# Patient Record
Sex: MALE | Race: BLACK OR AFRICAN AMERICAN | Employment: UNEMPLOYED | URBAN - METROPOLITAN AREA
[De-identification: names, ages, dates, MRNs, and addresses within clinical notes are randomized per-mention and may not be internally consistent; named-entity substitution may affect disease eponyms.]

---

## 2024-10-23 ENCOUNTER — HOSPITAL ENCOUNTER (INPATIENT)
Facility: HOSPITAL | Age: 59
LOS: 2 days | Discharge: HOME | DRG: 375 | End: 2024-10-25
Attending: EMERGENCY MEDICINE | Admitting: INTERNAL MEDICINE

## 2024-10-23 ENCOUNTER — APPOINTMENT (OUTPATIENT)
Facility: HOSPITAL | Age: 59
DRG: 375 | End: 2024-10-23

## 2024-10-23 DIAGNOSIS — R10.31 RIGHT LOWER QUADRANT ABDOMINAL PAIN: ICD-10-CM

## 2024-10-23 DIAGNOSIS — D64.9 ANEMIA, UNSPECIFIED TYPE: Primary | ICD-10-CM

## 2024-10-23 DIAGNOSIS — K57.32 DIVERTICULITIS OF COLON: ICD-10-CM

## 2024-10-23 PROBLEM — K57.92 DIVERTICULITIS: Status: ACTIVE | Noted: 2024-10-23

## 2024-10-23 LAB
ALBUMIN SERPL-MCNC: 2.9 G/DL (ref 3.5–5)
ALBUMIN/GLOB SERPL: 0.6 (ref 1.1–2.2)
ALP SERPL-CCNC: 78 U/L (ref 45–117)
ALT SERPL-CCNC: 24 U/L (ref 12–78)
ANION GAP SERPL CALC-SCNC: 2 MMOL/L (ref 2–12)
APPEARANCE UR: CLEAR
AST SERPL-CCNC: 26 U/L (ref 15–37)
BACTERIA URNS QL MICRO: NEGATIVE /HPF
BASOPHILS # BLD: 0 K/UL (ref 0–0.1)
BASOPHILS NFR BLD: 0 % (ref 0–1)
BILIRUB SERPL-MCNC: 0.2 MG/DL (ref 0.2–1)
BILIRUB UR QL: NEGATIVE
BUN SERPL-MCNC: 12 MG/DL (ref 6–20)
BUN/CREAT SERPL: 12 (ref 12–20)
CALCIUM SERPL-MCNC: 8.9 MG/DL (ref 8.5–10.1)
CHLORIDE SERPL-SCNC: 110 MMOL/L (ref 97–108)
CO2 SERPL-SCNC: 27 MMOL/L (ref 21–32)
COLOR UR: ABNORMAL
CREAT SERPL-MCNC: 1.02 MG/DL (ref 0.7–1.3)
DIFFERENTIAL METHOD BLD: ABNORMAL
EOSINOPHIL # BLD: 0.2 K/UL (ref 0–0.4)
EOSINOPHIL NFR BLD: 2 % (ref 0–7)
EPITH CASTS URNS QL MICRO: ABNORMAL /LPF
ERYTHROCYTE [DISTWIDTH] IN BLOOD BY AUTOMATED COUNT: 23.5 % (ref 11.5–14.5)
GLOBULIN SER CALC-MCNC: 5 G/DL (ref 2–4)
GLUCOSE SERPL-MCNC: 118 MG/DL (ref 65–100)
GLUCOSE UR STRIP.AUTO-MCNC: NEGATIVE MG/DL
HCT VFR BLD AUTO: 29 % (ref 36.6–50.3)
HEMOCCULT STL QL: NEGATIVE
HGB BLD-MCNC: 7.4 G/DL (ref 12.1–17)
HGB UR QL STRIP: NEGATIVE
HYALINE CASTS URNS QL MICRO: ABNORMAL /LPF (ref 0–2)
IMM GRANULOCYTES # BLD AUTO: 0 K/UL (ref 0–0.04)
IMM GRANULOCYTES NFR BLD AUTO: 0 % (ref 0–0.5)
KETONES UR QL STRIP.AUTO: NEGATIVE MG/DL
LEUKOCYTE ESTERASE UR QL STRIP.AUTO: ABNORMAL
LIPASE SERPL-CCNC: 27 U/L (ref 13–75)
LYMPHOCYTES # BLD: 2 K/UL (ref 0.8–3.5)
LYMPHOCYTES NFR BLD: 25 % (ref 12–49)
MCH RBC QN AUTO: 15.2 PG (ref 26–34)
MCHC RBC AUTO-ENTMCNC: 25.5 G/DL (ref 30–36.5)
MCV RBC AUTO: 59.4 FL (ref 80–99)
MONOCYTES # BLD: 0.8 K/UL (ref 0–1)
MONOCYTES NFR BLD: 10 % (ref 5–13)
NEUTS SEG # BLD: 5.1 K/UL (ref 1.8–8)
NEUTS SEG NFR BLD: 63 % (ref 32–75)
NITRITE UR QL STRIP.AUTO: NEGATIVE
NRBC # BLD: 0 K/UL (ref 0–0.01)
NRBC BLD-RTO: 0 PER 100 WBC
PH UR STRIP: 6 (ref 5–8)
PLATELET # BLD AUTO: 477 K/UL (ref 150–400)
PLATELET COMMENT: ABNORMAL
PMV BLD AUTO: 9.7 FL (ref 8.9–12.9)
POTASSIUM SERPL-SCNC: 3.9 MMOL/L (ref 3.5–5.1)
PROT SERPL-MCNC: 7.9 G/DL (ref 6.4–8.2)
PROT UR STRIP-MCNC: ABNORMAL MG/DL
RBC # BLD AUTO: 4.88 M/UL (ref 4.1–5.7)
RBC #/AREA URNS HPF: ABNORMAL /HPF (ref 0–5)
RBC MORPH BLD: ABNORMAL
SODIUM SERPL-SCNC: 139 MMOL/L (ref 136–145)
SP GR UR REFRACTOMETRY: 1.02
URINE CULTURE IF INDICATED: ABNORMAL
UROBILINOGEN UR QL STRIP.AUTO: 1 EU/DL (ref 0.2–1)
WBC # BLD AUTO: 8.1 K/UL (ref 4.1–11.1)
WBC URNS QL MICRO: ABNORMAL /HPF (ref 0–4)

## 2024-10-23 PROCEDURE — 85025 COMPLETE CBC W/AUTO DIFF WBC: CPT

## 2024-10-23 PROCEDURE — 81001 URINALYSIS AUTO W/SCOPE: CPT

## 2024-10-23 PROCEDURE — 82272 OCCULT BLD FECES 1-3 TESTS: CPT

## 2024-10-23 PROCEDURE — 6360000004 HC RX CONTRAST MEDICATION: Performed by: EMERGENCY MEDICINE

## 2024-10-23 PROCEDURE — 1100000003 HC PRIVATE W/ TELEMETRY

## 2024-10-23 PROCEDURE — 6360000002 HC RX W HCPCS: Performed by: INTERNAL MEDICINE

## 2024-10-23 PROCEDURE — 96374 THER/PROPH/DIAG INJ IV PUSH: CPT

## 2024-10-23 PROCEDURE — 80053 COMPREHEN METABOLIC PANEL: CPT

## 2024-10-23 PROCEDURE — 83690 ASSAY OF LIPASE: CPT

## 2024-10-23 PROCEDURE — 96372 THER/PROPH/DIAG INJ SC/IM: CPT

## 2024-10-23 PROCEDURE — 99285 EMERGENCY DEPT VISIT HI MDM: CPT

## 2024-10-23 PROCEDURE — 6360000002 HC RX W HCPCS: Performed by: EMERGENCY MEDICINE

## 2024-10-23 PROCEDURE — 74177 CT ABD & PELVIS W/CONTRAST: CPT

## 2024-10-23 PROCEDURE — 36415 COLL VENOUS BLD VENIPUNCTURE: CPT

## 2024-10-23 PROCEDURE — 2580000003 HC RX 258: Performed by: INTERNAL MEDICINE

## 2024-10-23 RX ORDER — BUTALBITAL, ACETAMINOPHEN AND CAFFEINE 50; 325; 40 MG/1; MG/1; MG/1
1 TABLET ORAL EVERY 4 HOURS PRN
Status: DISCONTINUED | OUTPATIENT
Start: 2024-10-23 | End: 2024-10-25 | Stop reason: HOSPADM

## 2024-10-23 RX ORDER — POLYETHYLENE GLYCOL 3350 17 G/17G
17 POWDER, FOR SOLUTION ORAL DAILY PRN
Status: DISCONTINUED | OUTPATIENT
Start: 2024-10-23 | End: 2024-10-25 | Stop reason: HOSPADM

## 2024-10-23 RX ORDER — SODIUM CHLORIDE 0.9 % (FLUSH) 0.9 %
5-40 SYRINGE (ML) INJECTION PRN
Status: DISCONTINUED | OUTPATIENT
Start: 2024-10-23 | End: 2024-10-25 | Stop reason: HOSPADM

## 2024-10-23 RX ORDER — POTASSIUM CHLORIDE 1.5 G/1.58G
40 POWDER, FOR SOLUTION ORAL PRN
Status: DISCONTINUED | OUTPATIENT
Start: 2024-10-23 | End: 2024-10-25 | Stop reason: HOSPADM

## 2024-10-23 RX ORDER — MAGNESIUM SULFATE IN WATER 40 MG/ML
2000 INJECTION, SOLUTION INTRAVENOUS PRN
Status: DISCONTINUED | OUTPATIENT
Start: 2024-10-23 | End: 2024-10-25 | Stop reason: HOSPADM

## 2024-10-23 RX ORDER — BUTALBITAL, ACETAMINOPHEN AND CAFFEINE 300; 40; 50 MG/1; MG/1; MG/1
1 CAPSULE ORAL EVERY 4 HOURS PRN
Status: DISCONTINUED | OUTPATIENT
Start: 2024-10-23 | End: 2024-10-23 | Stop reason: SDUPTHER

## 2024-10-23 RX ORDER — DICYCLOMINE HYDROCHLORIDE 10 MG/ML
20 INJECTION INTRAMUSCULAR ONCE
Status: COMPLETED | OUTPATIENT
Start: 2024-10-23 | End: 2024-10-23

## 2024-10-23 RX ORDER — IOPAMIDOL 755 MG/ML
100 INJECTION, SOLUTION INTRAVASCULAR
Status: COMPLETED | OUTPATIENT
Start: 2024-10-23 | End: 2024-10-23

## 2024-10-23 RX ORDER — HYDRALAZINE HYDROCHLORIDE 20 MG/ML
20 INJECTION INTRAMUSCULAR; INTRAVENOUS ONCE
Status: COMPLETED | OUTPATIENT
Start: 2024-10-23 | End: 2024-10-23

## 2024-10-23 RX ORDER — ONDANSETRON 4 MG/1
4 TABLET, ORALLY DISINTEGRATING ORAL EVERY 8 HOURS PRN
Status: DISCONTINUED | OUTPATIENT
Start: 2024-10-23 | End: 2024-10-25 | Stop reason: HOSPADM

## 2024-10-23 RX ORDER — ACETAMINOPHEN 325 MG/1
650 TABLET ORAL EVERY 6 HOURS PRN
Status: DISCONTINUED | OUTPATIENT
Start: 2024-10-23 | End: 2024-10-25 | Stop reason: HOSPADM

## 2024-10-23 RX ORDER — POTASSIUM CHLORIDE 1500 MG/1
40 TABLET, EXTENDED RELEASE ORAL PRN
Status: DISCONTINUED | OUTPATIENT
Start: 2024-10-23 | End: 2024-10-25 | Stop reason: HOSPADM

## 2024-10-23 RX ORDER — ONDANSETRON 2 MG/ML
4 INJECTION INTRAMUSCULAR; INTRAVENOUS EVERY 6 HOURS PRN
Status: DISCONTINUED | OUTPATIENT
Start: 2024-10-23 | End: 2024-10-25 | Stop reason: HOSPADM

## 2024-10-23 RX ORDER — ENOXAPARIN SODIUM 100 MG/ML
30 INJECTION SUBCUTANEOUS 2 TIMES DAILY
Status: DISCONTINUED | OUTPATIENT
Start: 2024-10-23 | End: 2024-10-25 | Stop reason: HOSPADM

## 2024-10-23 RX ORDER — SODIUM CHLORIDE 0.9 % (FLUSH) 0.9 %
5-40 SYRINGE (ML) INJECTION EVERY 12 HOURS SCHEDULED
Status: DISCONTINUED | OUTPATIENT
Start: 2024-10-23 | End: 2024-10-25 | Stop reason: HOSPADM

## 2024-10-23 RX ORDER — SODIUM CHLORIDE 9 MG/ML
INJECTION, SOLUTION INTRAVENOUS PRN
Status: DISCONTINUED | OUTPATIENT
Start: 2024-10-23 | End: 2024-10-25 | Stop reason: HOSPADM

## 2024-10-23 RX ORDER — ACETAMINOPHEN 650 MG/1
650 SUPPOSITORY RECTAL EVERY 6 HOURS PRN
Status: DISCONTINUED | OUTPATIENT
Start: 2024-10-23 | End: 2024-10-25 | Stop reason: HOSPADM

## 2024-10-23 RX ORDER — MORPHINE SULFATE 4 MG/ML
4 INJECTION, SOLUTION INTRAMUSCULAR; INTRAVENOUS EVERY 4 HOURS PRN
Status: DISCONTINUED | OUTPATIENT
Start: 2024-10-23 | End: 2024-10-25 | Stop reason: HOSPADM

## 2024-10-23 RX ORDER — POTASSIUM CHLORIDE 7.45 MG/ML
10 INJECTION INTRAVENOUS PRN
Status: DISCONTINUED | OUTPATIENT
Start: 2024-10-23 | End: 2024-10-25 | Stop reason: HOSPADM

## 2024-10-23 RX ADMIN — ENOXAPARIN SODIUM 30 MG: 100 INJECTION SUBCUTANEOUS at 22:04

## 2024-10-23 RX ADMIN — AMPICILLIN SODIUM AND SULBACTAM SODIUM 3000 MG: 2; 1 INJECTION, POWDER, FOR SOLUTION INTRAMUSCULAR; INTRAVENOUS at 22:17

## 2024-10-23 RX ADMIN — IOPAMIDOL 100 ML: 755 INJECTION, SOLUTION INTRAVENOUS at 07:36

## 2024-10-23 RX ADMIN — DICYCLOMINE HYDROCHLORIDE 20 MG: 10 INJECTION, SOLUTION INTRAMUSCULAR at 06:31

## 2024-10-23 RX ADMIN — HYDRALAZINE HYDROCHLORIDE 20 MG: 20 INJECTION INTRAMUSCULAR; INTRAVENOUS at 10:18

## 2024-10-23 RX ADMIN — SODIUM CHLORIDE, PRESERVATIVE FREE 10 ML: 5 INJECTION INTRAVENOUS at 22:04

## 2024-10-23 RX ADMIN — AMPICILLIN SODIUM AND SULBACTAM SODIUM 3000 MG: 2; 1 INJECTION, POWDER, FOR SOLUTION INTRAMUSCULAR; INTRAVENOUS at 15:43

## 2024-10-23 ASSESSMENT — PAIN SCALES - GENERAL
PAINLEVEL_OUTOF10: 5
PAINLEVEL_OUTOF10: 5
PAINLEVEL_OUTOF10: 10
PAINLEVEL_OUTOF10: 0
PAINLEVEL_OUTOF10: 5

## 2024-10-23 ASSESSMENT — PAIN DESCRIPTION - ORIENTATION: ORIENTATION: RIGHT;LEFT;LOWER

## 2024-10-23 ASSESSMENT — PAIN DESCRIPTION - LOCATION: LOCATION: ABDOMEN

## 2024-10-23 ASSESSMENT — LIFESTYLE VARIABLES
HOW OFTEN DO YOU HAVE A DRINK CONTAINING ALCOHOL: MONTHLY OR LESS
HOW MANY STANDARD DRINKS CONTAINING ALCOHOL DO YOU HAVE ON A TYPICAL DAY: 1 OR 2

## 2024-10-23 NOTE — PROGRESS NOTES
1900. End of Shift Note    Bedside shift change report given to Isaoe RN (oncoming nurse) by Logan Gates RN (offgoing nurse).  Report included the following information SBAR, Kardex, Intake/Output, MAR, and Recent Results    Shift worked:  9949-3257     Shift summary and any significant changes:     No changes     Concerns for physician to address:       Zone phone for oncoming shift:          Activity:     Number times ambulated in hallways past shift: 0  Number of times OOB to chair past shift: 3    Cardiac:   Cardiac Monitoring:            Access:  Current line(s): PIV     Genitourinary:   Urinary status: voiding    Respiratory:      Chronic home O2 use?: NO  Incentive spirometer at bedside: NO       GI:     Current diet:  ADULT DIET; Clear Liquid  Passing flatus: YES  Tolerating current diet: YES       Pain Management:   Patient states pain is manageable on current regimen: YES    Skin:     Interventions: increase time out of bed    Patient Safety:  Fall Score:    Interventions: gripper socks       Length of Stay:  Expected LOS: 3  Actual LOS: 0      Logan Gates RN

## 2024-10-23 NOTE — ED NOTES
Pt alert, conversational, and in no acute distress. Pt resting in bed, bed wheels locked, bed in lowest position, side rails in upright position x1, pt on the monitor x3, and call bell within reach.

## 2024-10-23 NOTE — CONSULTS
Gastroenterology Attending Physician attestation statement and comments.  This patient was seen and examined by me in a face-to-face visit today. I reviewed the medical record including lab work, imaging and other provider notes. I confirmed the history as described above. I spoke to the patient, reviewed the medical record including lab work, imaging and other provider notes. I discussed this case in detail with STANISLAW Estrella. I formulated an  assessment of this patient and developed a treatment plan. I agree with the above consultation note. I agree with the history, exam and assessment and plan as outlined in the note.  I would like to add the followinyo AAM seen for evaluation of presumed diverticulitis associated with few days of preceding mild RLQ abd pain without F/C/N/V, D/C, hematochezia, melena, BRBPR, WL.  Significant fam hx CRC (S at age 54yo).  Pt has not had prior colonoscopy or CRC screening.  PE with VSS, no focal T on PE, RRR, CTA.  Labs w/ Hgb 7.4 (was 11.9 in ), MCV 58.5, nl BUN:Cr.  CT noted cecal base WT with minimal associated inflammation with pericaval LAD to 14mm,  Suggestion of minimal diverticulitis vs. underlying neoplasm.  Ddx for the noted abnl CT with profond microcytic anemia, and RLQ abd pain with fam hx Crc is more concerning for malignancy such as CRC then diverticulitis.  If treated as diverticulitis, would need to complete 10wk course abx and delay colonoscopy investigation for 2wks to allow for healing. We note that cecal location for diverticulitis is unusual   Plan:  1) Complete 10d abx  2) Will reassess tomorrow and determine if can give expedited time for colonoscopy to investigate noted findings.   Gerard Varner MD    GI CONSULTATION NOTE  Scott Warner PA-C  [phone] NP in-hospital cell phone M-F until 4:30  After 5pm or on weekends, please call  for physician on call    NAME: Felipe Nix : 1965 MRN: [id number]   Attending: Valerie  Date/Time:  -40 MG CAPS per capsule Take 1 capsule by mouth every 4 hours as needed 7/2/21   Automatic Reconciliation, Ar   hydroCHLOROthiazide (MICROZIDE) 12.5 MG capsule Take 2 capsules by mouth daily    Automatic Reconciliation, Ar   lidocaine (LIDODERM) 5 % Apply patch to the affected area for 12 hours a day and remove for 12 hours a day. 7/2/21   Automatic Reconciliation, Ar   mupirocin (BACTROBAN) 2 % cream Apply topically 2 times daily 12/15/19   Automatic Reconciliation, Ar   Selenium Sulfide 2.25 % SHAM Use shampoo in scalp 3 times weekly for 2 weeks. 12/15/19   Automatic Reconciliation, Ar       Patient Active Problem List   Diagnosis    Diverticulitis       REVIEW OF SYSTEMS:    Constitutional: negative fever, negative chills, negative weight loss  Eyes:   negative visual changes  ENT:   negative sore throat, tongue or lip swelling   Respiratory:  negative cough, negative dyspnea  Cards:  negative for chest pain, palpitations, lower extremity edema  GI:   See HPI  :  negative for frequency, dysuria  Integument:  negative for rash and pruritus  Heme:  negative for easy bruising and gum/nose bleeding  Musculoskel: negative for myalgias,  back pain and muscle weakness  Neuro: negative for headaches, dizziness, vertigo  Psych: negative for feelings of anxiety, depression   Objective:   VITALS:    Vitals:    10/23/24 1358   BP: (!) 152/101   Pulse: 80   Resp: 16   Temp: 98.8 °F (37.1 °C)   SpO2: 100%       PHYSICAL EXAM:   General:          Alert, WD, WN, cooperative, no distress, appears stated age.    Head:               Normocephalic, without obvious abnormality, atraumatic.  Eyes:               Conjunctivae clear and pale, anicteric sclerae.  Pupils are equal  Abdomen:        Soft, mild RLQ pain. Not distended.  Bowel sounds normal. No masses  Extremities:     Atraumatic, No cyanosis.  No edema. No clubbing  Skin:                Texture, turgor normal. No rashes/lesions/jaundice  Psych:             Good

## 2024-10-23 NOTE — ED PROVIDER NOTES
Comment ANISOCYTOSIS  3+        RBC Comment HYPOCHROMIA  4+        RBC Comment POLYCHROMASIA  PRESENT       Iron and TIBC   Result Value Ref Range    Iron 15 (L) 35 - 150 ug/dL    TIBC 430 250 - 450 ug/dL    Iron % Saturation 3 (L) 20 - 50 %       RADIOLOGIC STUDIES:   Non x-ray images such as CT, Ultrasound and MRI are read by the radiologist. X-ray images are visualized and preliminarily interpreted by the ED Provider with the findings as listed in the ED Course section below.     Interpretation per the Radiologist is listed below, if available at the time of this note:    CT ABDOMEN PELVIS W IV CONTRAST Additional Contrast? None   Final Result   Wall thickening at the base of the cecum with minimal associated inflammation.   While this may be related to minimal diverticulitis, neoplasm should be   considered. There is an associated 14 mm pericecal lymph node.      Electronically signed by TERA MIRELES                    ED Course and Differential Diagnosis/MDM     6:12 AM EDT DDx, ED Course, and Reassessment:    Vitals: Patient Vitals for the past 24 hrs:   BP Temp Pulse Resp SpO2 Weight   10/23/24 0547 (!) 199/99 98.1 °F (36.7 °C) 71 18 100 % 117 kg (257 lb 15 oz)     ED COURSE/Records Reviewed with summary (prior medical records and Nursing notes)  Nursing Notes and Old Medical Records    ED Course as of 10/24/24 1432   Wed Oct 23, 2024   0644 CBC significant for hemoglobin of 7.4 with a hematocrit of 29.  Prior hemoglobin was 11.9 a year ago.  White count within normal limits..  UA negative for infection or blood. [MS]   0712 CMP unremarkable. [MS]   0833 CT ABDOMEN PELVIS W IV CONTRAST Additional Contrast? None  CT positive for wall thickening at the base of the cecum with minimal inflammation.  Could represent diverticulitis versus neoplasm.  With his hemoglobin 7.4, will obtain a stool sample to test for occult blood and discussed with the hospitalist for admission. [MS]   1014 Stool  review of radiographic studies, re-evaluation of patient's condition and review of old charts    Care discussed with: admitting provider        ED FINAL IMPRESSION     1. Anemia, unspecified type    2. Right lower quadrant abdominal pain    3. Diverticulitis of colon      DISPOSITION/PLAN     Admit Note: Pt is being admitted by Dr. Gibson. The results of their tests and reason(s) for their admission have been discussed with pt and/or available family. They convey agreement and understanding for the need to be admitted and for the admission diagnosis.    (Please note that parts of this dictation were completed with voice recognition software. Quite often unanticipated grammatical, syntax, homophones, and other interpretive errors are inadvertently transcribed by the computer software. Please disregards these errors. Please excuse any errors that have escaped final proofreading.)    I am the Primary Clinician of Record.   Ritesh Alvarez MD  (Electronically signed)           Ritesh Alvarez MD  10/24/24 7240

## 2024-10-23 NOTE — ED NOTES
Bedside and Verbal shift change report given to WAYNE Braden (oncoming nurse) by WAYNE Fulton (offgoing nurse). Report included the following information Nurse Handoff Report, ED Encounter Summary, ED SBAR, and Adult Overview.

## 2024-10-23 NOTE — PROGRESS NOTES
1400: Patient arrived from ED. Vitals taken. Admission assessments done.    1430: STANISLAW Chavez from GI at bedside.    1500: Bedside and Verbal shift change report given to Franco (oncoming nurse) by Caitie (offgoing nurse). Report included the following information Nurse Handoff Report.

## 2024-10-23 NOTE — ED NOTES
Attempted to call report to nurse for Rm 3218, WAYNE Blood Ext. [phone]; advised RN off the unit att. Called Med-Tele Charge WAYNE Whitaker.

## 2024-10-23 NOTE — ED NOTES
TRANSFER - OUT REPORT:    Verbal report given to WAYNE Whitaker on Felipe Nix  being transferred to [facility] for routine progression of patient care       Report consisted of patient's Situation, Background, Assessment and   Recommendations(SBAR).     Information from the following report(s) Nurse Handoff Report, ED Encounter Summary, ED SBAR, and MAR was reviewed with the receiving nurse.    [place] Fall Assessment:    Presents to emergency department  because of falls (Syncope, seizure, or loss of consciousness): No  Age > 70: No  Altered Mental Status, Intoxication with alcohol or substance confusion (Disorientation, impaired judgment, poor safety awaremess, or inability to follow instructions): No  Impaired Mobility: Ambulates or transfers with assistive devices or assistance; Unable to ambulate or transer.: No  Nursing Judgement: No          Lines:   Peripheral IV 10/23/24 Distal;Left;Anterior Cephalic (Active)   Site Assessment Clean, dry & intact 10/23/24 0617   Line Status Blood return noted 10/23/24 0617   Line Care Cap changed;Connections checked and tightened 10/23/24 0617   Phlebitis Assessment No symptoms 10/23/24 0617   Infiltration Assessment 0 10/23/24 0617   Dressing Status New dressing applied;Clean, dry & intact 10/23/24 0617   Dressing Type Transparent 10/23/24 0617   Dressing Intervention New 10/23/24 0617        Opportunity for questions and clarification was provided.      Patient transported with:  Monitor and Tech

## 2024-10-23 NOTE — ED TRIAGE NOTES
Pt arrives w cc of sharp RLQ pain x a few days. Denies N/V/D, last BM yday. Pt hypertensive in triage, reports he ran out of BP meds a few months ago.

## 2024-10-24 LAB
BASOPHILS # BLD: 0.1 K/UL (ref 0–0.1)
BASOPHILS NFR BLD: 1 % (ref 0–1)
DIFFERENTIAL METHOD BLD: ABNORMAL
EOSINOPHIL # BLD: 0.2 K/UL (ref 0–0.4)
EOSINOPHIL NFR BLD: 3 % (ref 0–7)
ERYTHROCYTE [DISTWIDTH] IN BLOOD BY AUTOMATED COUNT: 23.5 % (ref 11.5–14.5)
HCT VFR BLD AUTO: 28.3 % (ref 36.6–50.3)
HGB BLD-MCNC: 7.2 G/DL (ref 12.1–17)
IMM GRANULOCYTES # BLD AUTO: 0 K/UL (ref 0–0.04)
IMM GRANULOCYTES NFR BLD AUTO: 0 % (ref 0–0.5)
IRON SATN MFR SERPL: 3 % (ref 20–50)
IRON SERPL-MCNC: 15 UG/DL (ref 35–150)
LYMPHOCYTES # BLD: 1.5 K/UL (ref 0.8–3.5)
LYMPHOCYTES NFR BLD: 24 % (ref 12–49)
MCH RBC QN AUTO: 15.2 PG (ref 26–34)
MCHC RBC AUTO-ENTMCNC: 25.4 G/DL (ref 30–36.5)
MCV RBC AUTO: 59.7 FL (ref 80–99)
MONOCYTES # BLD: 0.7 K/UL (ref 0–1)
MONOCYTES NFR BLD: 11 % (ref 5–13)
NEUTS SEG # BLD: 3.9 K/UL (ref 1.8–8)
NEUTS SEG NFR BLD: 61 % (ref 32–75)
NRBC # BLD: 0 K/UL (ref 0–0.01)
NRBC BLD-RTO: 0 PER 100 WBC
PLATELET # BLD AUTO: 462 K/UL (ref 150–400)
PLATELET COMMENT: ABNORMAL
PMV BLD AUTO: 9.7 FL (ref 8.9–12.9)
RBC # BLD AUTO: 4.74 M/UL (ref 4.1–5.7)
RBC MORPH BLD: ABNORMAL
TIBC SERPL-MCNC: 430 UG/DL (ref 250–450)
WBC # BLD AUTO: 6.4 K/UL (ref 4.1–11.1)

## 2024-10-24 PROCEDURE — 84466 ASSAY OF TRANSFERRIN: CPT

## 2024-10-24 PROCEDURE — 1100000003 HC PRIVATE W/ TELEMETRY

## 2024-10-24 PROCEDURE — 2580000003 HC RX 258: Performed by: INTERNAL MEDICINE

## 2024-10-24 PROCEDURE — 6360000002 HC RX W HCPCS: Performed by: INTERNAL MEDICINE

## 2024-10-24 PROCEDURE — 83540 ASSAY OF IRON: CPT

## 2024-10-24 PROCEDURE — 6370000000 HC RX 637 (ALT 250 FOR IP)

## 2024-10-24 PROCEDURE — 36415 COLL VENOUS BLD VENIPUNCTURE: CPT

## 2024-10-24 PROCEDURE — 6370000000 HC RX 637 (ALT 250 FOR IP): Performed by: INTERNAL MEDICINE

## 2024-10-24 PROCEDURE — 85025 COMPLETE CBC W/AUTO DIFF WBC: CPT

## 2024-10-24 RX ORDER — HYDROCHLOROTHIAZIDE 25 MG/1
25 TABLET ORAL DAILY
Status: DISCONTINUED | OUTPATIENT
Start: 2024-10-24 | End: 2024-10-25 | Stop reason: HOSPADM

## 2024-10-24 RX ADMIN — SODIUM CHLORIDE, PRESERVATIVE FREE 10 ML: 5 INJECTION INTRAVENOUS at 09:04

## 2024-10-24 RX ADMIN — ENOXAPARIN SODIUM 30 MG: 100 INJECTION SUBCUTANEOUS at 09:03

## 2024-10-24 RX ADMIN — HYDROCHLOROTHIAZIDE 25 MG: 25 TABLET ORAL at 10:20

## 2024-10-24 RX ADMIN — AMPICILLIN SODIUM AND SULBACTAM SODIUM 3000 MG: 2; 1 INJECTION, POWDER, FOR SOLUTION INTRAMUSCULAR; INTRAVENOUS at 16:11

## 2024-10-24 RX ADMIN — AMPICILLIN SODIUM AND SULBACTAM SODIUM 3000 MG: 2; 1 INJECTION, POWDER, FOR SOLUTION INTRAMUSCULAR; INTRAVENOUS at 22:17

## 2024-10-24 RX ADMIN — POLYETHYLENE GLYCOL-3350 AND ELECTROLYTES 4000 ML: 236; 6.74; 5.86; 2.97; 22.74 POWDER, FOR SOLUTION ORAL at 17:37

## 2024-10-24 RX ADMIN — AMPICILLIN SODIUM AND SULBACTAM SODIUM 3000 MG: 2; 1 INJECTION, POWDER, FOR SOLUTION INTRAMUSCULAR; INTRAVENOUS at 04:21

## 2024-10-24 RX ADMIN — SODIUM CHLORIDE, PRESERVATIVE FREE 10 ML: 5 INJECTION INTRAVENOUS at 21:00

## 2024-10-24 RX ADMIN — AMPICILLIN SODIUM AND SULBACTAM SODIUM 3000 MG: 2; 1 INJECTION, POWDER, FOR SOLUTION INTRAMUSCULAR; INTRAVENOUS at 10:21

## 2024-10-24 RX ADMIN — IRON SUCROSE 200 MG: 20 INJECTION, SOLUTION INTRAVENOUS at 10:20

## 2024-10-24 ASSESSMENT — PAIN SCALES - GENERAL: PAINLEVEL_OUTOF10: 0

## 2024-10-24 NOTE — PROGRESS NOTES
GI PROGRESS NOTE  Scott Warner PA-C  [phone] NP in-hospital cell phone M-F until 4:30  After 5pm or on weekends, please call  for physician on call    NAME:Felipe Nix :1965 MRN:[id number]   ATTG: [unfilled]   PCP: No primary care provider on file.  Date/Time:  10/24/2024 1:56 PM     Primary GI: Dr. Varner     Reason for following: Diverticulitis, microcytic anemia    Assessment:   ?Diverticulitis in cecum w/ enlarged pericecal LN  RLQ pain  Microcytic anemia  Fhx CRC in first degree relative  VSS  HgB 7.4 POA, last normal 11.9   MCV 59.4  BUNCr nl  CT AP w/  Wall thickening at the base of the cecum with minimal associated inflammation.  While this may be related to minimal diverticulitis, neoplasm should be considered. There is an associated 14 mm pericecal lymph node.    Scope Hx  None prior  Plan:   Given inability to perform expedited colonscopy out patient, will plan for CLN tomorrow to eval  Diverticulitis normally contraindicates CLN to eval at this time. But certainly needs CLN ASAP given cecal diverticulitis is fairly uncommon. Pt has severe micro anemia, Fhx of CRC in sister dx in her 50s Agree w/ CT read that CRC needs R/o at expedited rate.  Continue w/ abx and CLD for diverticulitis treatment at this time.   NPO after midnight  Rest of care per primary team.  We will continue to follow.     Plan discussed with Dr. Varner  Subjective:   Discussed with RN events overnight. Feeling great today. No RLQ pain anymore.    Review of Systems:  Symptom Y/N Comments  Symptom Y/N Comments   Fever/Chills N   Chest Pain N    Cough N   Headaches N    Sputum N   Joint Pain N    SOB/POPE N   Pruritis/Rash N    Tolerating Diet Y   Other       Could NOT obtain due to:      Objective:   VITALS:   Last 24hrs VS reviewed since prior progress note. Most recent are:  Vitals:    10/24/24 0830   BP: (!) 159/90   Pulse: 71   Resp: 18   Temp: 98.6 °F (37 °C)   SpO2: 100%       Intake/Output Summary (Last

## 2024-10-24 NOTE — PROGRESS NOTES
End of Shift Note    Bedside shift change report given to JEFFERSON Hensley (oncoming nurse) by Norbert Vang RN (offgoing nurse).  Report included the following information SBAR REPORTS LIST: SBAR, Kardex, Intake/Output, MAR, Recent Results, Med Rec Status, and Quality Measures    Shift worked:  NIGHT     Shift summary and any significant changes:    The pt had an uneventful night  Declined ordered lab work of Iron TIBC and  Transferrin this evening. Education on the importance of lab work and results was given to the patient. Pt informed nurse to do the lab work in the morning. NP on call was informed.  Lab work done this morning     Concerns for physician to address:       Zone phone for oncoming shift:          Activity:  Activity: Ambulate  Number times ambulated in hallways past shift: 0  Number of times OOB to chair past shift: 2    Cardiac:   Cardiac Monitoring: YES / NO: Yes        Access:  Current line(s): PIV    Genitourinary:   Urinary status: Urinary status: Patient is voiding without difficulty.    Respiratory:   oxygen delivery: room air  Chronic home O2 use?: YES / NO: No  Incentive spirometer at bedside: YES / NO: No      GI:  Current diet:  DIET: clear  Passing flatus: YES / NO: Yes  Tolerating current diet: YES / NO: Yes      Pain Management:   Patient states pain is manageable on current regimen: YES / NO: Yes    Skin:    Interventions: JOSE MIGUEL SCALE: 21    Patient Safety:  Fall Score:   Interventions: Fall Interventions Provided: Implemented/recommended use of non-skid footwear, Implemented/recommended use of fall risk identification flag to all team members, Implemented/recommended assistive devices and encouraged their use, Implemented/recommended resources for alarm system (personal alarm, bed alarm, call bell, etc.) , and Implemented/recommended environmental changes (remove hazards, lower bed, improve lighting, etc.)      Length of Stay:  Expected LOS: 3  Actual LOS: 1      Norbert Vang

## 2024-10-24 NOTE — PROGRESS NOTES
Hospitalist Progress Note    NAME:   Felipe Nix   : 1965   MRN: [id number]     Date/Time: 10/24/2024 2:33 PM  Patient PCP: No primary care provider on file.    Estimated discharge date: 10/26  Barriers: GI clearance, colonoscopy, improvement of anemia      Assessment / Plan:    Acute diverticulitis  -CT is abnormal concerning for inflammation in the cecum and concerning for neoplasm  -He also does have iron deficiency anemia which is new which is concerning for malignancy  -Will continue Unasyn.  GI planning on colonoscopy in a.m. tomorrow.  Hold Lovenox for DVT prophylaxis.  Keep n.p.o. from midnight.  -Appreciate recommendations from GI    Acute iron deficiency anemia due to blood loss  -Iron studies reveal evidence of iron deficiency anemia.  Will start Venofer.  -For colonoscopy in a.m. tomorrow.  Hemoglobin is 7.2.  Check CBC in AM.  Transfuse to keep hemoglobin more than 7    Hypertension  -Continue PTA hydrochlorothiazide            Medical Decision Making:   I personally reviewed labs: CBC, BMP  I personally reviewed imaging: CT abdomen showing cecal inflammation  I personally reviewed EKG: Telemetry monitoring  Toxic drug monitoring: Monitor for allergic reactions while on IV Venofer  Discussed case with: Pharmacy regarding medications        Code Status: Full code  DVT Prophylaxis: SCDs  GI Prophylaxis:    Subjective:     Chief Complaint / Reason for Physician Visit  Reports feeling better.  Does not report any abdominal pain, nausea vomiting, diarrhea or constipation  Overnight events noted      Objective:     VITALS:   Last 24hrs VS reviewed since prior progress note. Most recent are:  Patient Vitals for the past 24 hrs:   BP Temp Temp src Pulse Resp SpO2   10/24/24 0830 (!) 159/90 98.6 °F (37 °C) Oral 71 18 100 %   10/24/24 0306 131/65 98.2 °F (36.8 °C) Oral 60 17 96 %   10/23/24 2010 (!) 142/80 98.3 °F (36.8 °C) Oral 87 16 99 %   10/23/24 1710 136/81 98.4 °F (36.9 °C) -- 84 -- 100 %

## 2024-10-24 NOTE — H&P
Hospitalist Admission Note    NAME:   Felipe Nix   : 1965   MRN: [id number]     Date/Time: 10/23/2024 8:52 PM    Patient PCP: No primary care provider on file.    ______________________________________________________________________  Given the patient's current clinical presentation, I have a high level of concern for decompensation if discharged from the emergency department.  Complex decision making was performed, which includes reviewing the patient's available past medical records, laboratory results, and x-ray films.       My assessment of this patient's clinical condition and my plan of care is as follows.    Assessment / Plan:  Right lower quadrant abdominal pain  -CT of abdomen showed possible cecal lesion possible mild diverticulitis versus others  -The symptoms are not typical for diverticulitis, patient has no rebound tenderness the location is not a common location for diverticulitis, lab workup does not match with diverticulitis as well as CT findings not typical for diverticulitis moreover patient has severe anemia most likely iron deficiency anemia (hypochromic microcytic with high RDW) and I am under the impression the  malignancy is on the top of the list of differential diagnosis  - Patient empirically started on Unasyn  - Clear liquid  - Consult with GI to see if the patient is a candidate for colonoscopy patient has not had a colonoscopy   he never had a colonoscopy done he never had colonoscopy done    Severe anemia hypochromic microcytic  Hemoglobin on 2022 was 11.9 and current hemoglobin is 7.4  Most likely iron deficiency anemia with GI service, proceed with iron workup patient might benefit from IV iron transfusion      Medical Decision Making:   I personally reviewed labs:  I personally reviewed imaging:  I personally reviewed EKG:  Toxic drug monitoring:   Discussed case with: ED provider. After discussion I am in agreement that acuity of patient's medical condition  node. Electronically signed by TEAR MORRISSEY     _______________________________________________________________________    TOTAL TIME:  76 Minutes    Critical Care Provided     Minutes non procedure based    Signed: Shahab Padilla MD    Procedures: see electronic medical records for all procedures/Xrays and details which were not copied into this note but were reviewed prior to creation of Plan.

## 2024-10-25 ENCOUNTER — ANESTHESIA EVENT (OUTPATIENT)
Facility: HOSPITAL | Age: 59
End: 2024-10-25

## 2024-10-25 ENCOUNTER — ANESTHESIA (OUTPATIENT)
Facility: HOSPITAL | Age: 59
End: 2024-10-25

## 2024-10-25 VITALS
OXYGEN SATURATION: 100 % | RESPIRATION RATE: 18 BRPM | HEART RATE: 73 BPM | BODY MASS INDEX: 34.94 KG/M2 | DIASTOLIC BLOOD PRESSURE: 82 MMHG | HEIGHT: 72 IN | TEMPERATURE: 98.4 F | WEIGHT: 257.94 LBS | SYSTOLIC BLOOD PRESSURE: 137 MMHG

## 2024-10-25 LAB
ANION GAP SERPL CALC-SCNC: 4 MMOL/L (ref 2–12)
BASOPHILS # BLD: 0 K/UL (ref 0–0.1)
BASOPHILS NFR BLD: 0 % (ref 0–1)
BUN SERPL-MCNC: 9 MG/DL (ref 6–20)
BUN/CREAT SERPL: 9 (ref 12–20)
CALCIUM SERPL-MCNC: 8.9 MG/DL (ref 8.5–10.1)
CHLORIDE SERPL-SCNC: 104 MMOL/L (ref 97–108)
CO2 SERPL-SCNC: 29 MMOL/L (ref 21–32)
CREAT SERPL-MCNC: 0.95 MG/DL (ref 0.7–1.3)
DIFFERENTIAL METHOD BLD: ABNORMAL
EOSINOPHIL # BLD: 0.1 K/UL (ref 0–0.4)
EOSINOPHIL NFR BLD: 2 % (ref 0–7)
ERYTHROCYTE [DISTWIDTH] IN BLOOD BY AUTOMATED COUNT: 23.6 % (ref 11.5–14.5)
GLUCOSE SERPL-MCNC: 77 MG/DL (ref 65–100)
HCT VFR BLD AUTO: 28.2 % (ref 36.6–50.3)
HGB BLD-MCNC: 7.3 G/DL (ref 12.1–17)
IMM GRANULOCYTES # BLD AUTO: 0 K/UL (ref 0–0.04)
IMM GRANULOCYTES NFR BLD AUTO: 0 % (ref 0–0.5)
LYMPHOCYTES # BLD: 1.4 K/UL (ref 0.8–3.5)
LYMPHOCYTES NFR BLD: 19 % (ref 12–49)
MCH RBC QN AUTO: 15.5 PG (ref 26–34)
MCHC RBC AUTO-ENTMCNC: 25.9 G/DL (ref 30–36.5)
MCV RBC AUTO: 60 FL (ref 80–99)
MONOCYTES # BLD: 0.6 K/UL (ref 0–1)
MONOCYTES NFR BLD: 8 % (ref 5–13)
NEUTS SEG # BLD: 5.2 K/UL (ref 1.8–8)
NEUTS SEG NFR BLD: 71 % (ref 32–75)
NRBC # BLD: 0 K/UL (ref 0–0.01)
NRBC BLD-RTO: 0 PER 100 WBC
PLATELET # BLD AUTO: 480 K/UL (ref 150–400)
PLATELET COMMENT: ABNORMAL
POTASSIUM SERPL-SCNC: 3.4 MMOL/L (ref 3.5–5.1)
RBC # BLD AUTO: 4.7 M/UL (ref 4.1–5.7)
RBC MORPH BLD: ABNORMAL
SODIUM SERPL-SCNC: 137 MMOL/L (ref 136–145)
WBC # BLD AUTO: 7.3 K/UL (ref 4.1–11.1)

## 2024-10-25 PROCEDURE — 85025 COMPLETE CBC W/AUTO DIFF WBC: CPT

## 2024-10-25 PROCEDURE — 88305 TISSUE EXAM BY PATHOLOGIST: CPT

## 2024-10-25 PROCEDURE — 7100000011 HC PHASE II RECOVERY - ADDTL 15 MIN: Performed by: INTERNAL MEDICINE

## 2024-10-25 PROCEDURE — 6370000000 HC RX 637 (ALT 250 FOR IP): Performed by: INTERNAL MEDICINE

## 2024-10-25 PROCEDURE — 6360000002 HC RX W HCPCS: Performed by: INTERNAL MEDICINE

## 2024-10-25 PROCEDURE — 2500000003 HC RX 250 WO HCPCS: Performed by: NURSE ANESTHETIST, CERTIFIED REGISTERED

## 2024-10-25 PROCEDURE — 3700000001 HC ADD 15 MINUTES (ANESTHESIA): Performed by: INTERNAL MEDICINE

## 2024-10-25 PROCEDURE — 88360 TUMOR IMMUNOHISTOCHEM/MANUAL: CPT

## 2024-10-25 PROCEDURE — 7100000010 HC PHASE II RECOVERY - FIRST 15 MIN: Performed by: INTERNAL MEDICINE

## 2024-10-25 PROCEDURE — 0DBH8ZX EXCISION OF CECUM, VIA NATURAL OR ARTIFICIAL OPENING ENDOSCOPIC, DIAGNOSTIC: ICD-10-PCS | Performed by: INTERNAL MEDICINE

## 2024-10-25 PROCEDURE — 3600007512: Performed by: INTERNAL MEDICINE

## 2024-10-25 PROCEDURE — 3600007502: Performed by: INTERNAL MEDICINE

## 2024-10-25 PROCEDURE — 6360000002 HC RX W HCPCS: Performed by: NURSE ANESTHETIST, CERTIFIED REGISTERED

## 2024-10-25 PROCEDURE — 2709999900 HC NON-CHARGEABLE SUPPLY: Performed by: INTERNAL MEDICINE

## 2024-10-25 PROCEDURE — 80048 BASIC METABOLIC PNL TOTAL CA: CPT

## 2024-10-25 PROCEDURE — 2580000003 HC RX 258: Performed by: INTERNAL MEDICINE

## 2024-10-25 PROCEDURE — 36415 COLL VENOUS BLD VENIPUNCTURE: CPT

## 2024-10-25 PROCEDURE — 3700000000 HC ANESTHESIA ATTENDED CARE: Performed by: INTERNAL MEDICINE

## 2024-10-25 PROCEDURE — 3E00XMZ INTRODUCTION OF PIGMENT INTO SKIN AND MUCOUS MEMBRANES, EXTERNAL APPROACH: ICD-10-PCS | Performed by: INTERNAL MEDICINE

## 2024-10-25 RX ORDER — L.ACID,PARA/B.BIFIDUM/S.THERM 8B CELL
1 CAPSULE ORAL DAILY
Qty: 10 CAPSULE | Refills: 0 | Status: SHIPPED | OUTPATIENT
Start: 2024-10-25

## 2024-10-25 RX ORDER — FERROUS SULFATE 325(65) MG
325 TABLET ORAL
Qty: 90 TABLET | Refills: 1 | Status: SHIPPED | OUTPATIENT
Start: 2024-10-25

## 2024-10-25 RX ORDER — POTASSIUM CHLORIDE 750 MG/1
40 TABLET, EXTENDED RELEASE ORAL ONCE
Status: COMPLETED | OUTPATIENT
Start: 2024-10-25 | End: 2024-10-25

## 2024-10-25 RX ADMIN — POTASSIUM CHLORIDE 40 MEQ: 750 TABLET, EXTENDED RELEASE ORAL at 11:28

## 2024-10-25 RX ADMIN — HYDROCHLOROTHIAZIDE 25 MG: 25 TABLET ORAL at 08:48

## 2024-10-25 RX ADMIN — PROPOFOL 50 MG: 10 INJECTION, EMULSION INTRAVENOUS at 10:15

## 2024-10-25 RX ADMIN — LIDOCAINE HYDROCHLORIDE 100 MG: 20 INJECTION, SOLUTION EPIDURAL; INFILTRATION; INTRACAUDAL; PERINEURAL at 10:04

## 2024-10-25 RX ADMIN — PROPOFOL 50 MG: 10 INJECTION, EMULSION INTRAVENOUS at 10:12

## 2024-10-25 RX ADMIN — PROPOFOL 50 MG: 10 INJECTION, EMULSION INTRAVENOUS at 10:09

## 2024-10-25 RX ADMIN — IRON SUCROSE 200 MG: 20 INJECTION, SOLUTION INTRAVENOUS at 11:28

## 2024-10-25 RX ADMIN — PROPOFOL 50 MG: 10 INJECTION, EMULSION INTRAVENOUS at 10:04

## 2024-10-25 RX ADMIN — PROPOFOL 50 MG: 10 INJECTION, EMULSION INTRAVENOUS at 10:17

## 2024-10-25 RX ADMIN — AMPICILLIN SODIUM AND SULBACTAM SODIUM 3000 MG: 2; 1 INJECTION, POWDER, FOR SOLUTION INTRAMUSCULAR; INTRAVENOUS at 11:29

## 2024-10-25 RX ADMIN — PROPOFOL 50 MG: 10 INJECTION, EMULSION INTRAVENOUS at 10:19

## 2024-10-25 RX ADMIN — PROPOFOL 50 MG: 10 INJECTION, EMULSION INTRAVENOUS at 10:05

## 2024-10-25 RX ADMIN — AMPICILLIN SODIUM AND SULBACTAM SODIUM 3000 MG: 2; 1 INJECTION, POWDER, FOR SOLUTION INTRAMUSCULAR; INTRAVENOUS at 04:05

## 2024-10-25 RX ADMIN — SODIUM CHLORIDE, PRESERVATIVE FREE 10 ML: 5 INJECTION INTRAVENOUS at 08:48

## 2024-10-25 ASSESSMENT — PAIN SCALES - GENERAL: PAINLEVEL_OUTOF10: 0

## 2024-10-25 ASSESSMENT — PAIN - FUNCTIONAL ASSESSMENT: PAIN_FUNCTIONAL_ASSESSMENT: 0-10

## 2024-10-25 NOTE — PROGRESS NOTES
Discharge order received and patient was informed. Discharge education provided to patient  at the bedside. Opportunity for questions offered and  clarification provided as required. Patient verbalized understanding of discharge instructions. Peripheral IV access removed and belongings returned to patient at discharge. Copy of discharge paperwork provided to patient for personal reference. Patient was stable during shift and at discharge. Patient was safely discharged from the unit.

## 2024-10-25 NOTE — CARE COORDINATION
Chart reviewed and discharge order seen.     CM returned to unit. Nurse stated that the patient left the floor. He left when he discovered he was discharged and did not wait for dc paperwork.    CM unable to see patient, prior to leaving.    Kiara Plascencia, WAYNE  Care Manager

## 2024-10-25 NOTE — CONSULTS
Oral Q4H PRN         Review of Systems:  Constitutional No fevers, chills, night sweats, excessive fatigue or weight loss.   Allergic/Immunologic No recent allergic reactions   Eyes No significant visual difficulties. No diplopia.   ENMT No problems with hearing, no sore throat, no sinus drainage.   Endocrine No hot flashes or night sweats. No cold intolerance, polyuria, or polydipsia   Hematologic/Lymphatic No easy bruising or bleeding.  The patient denies any tender or palpable lymph nodes   Breasts No abnormal masses of breast, nipple discharge or pain.   Respiratory No dyspnea on exertion, orthopnea, chest pain, cough or hemoptysis.   Cardiovascular No anginal chest pain, irregular heart beat, tachycardia, palpitations or orthopnea.   Gastrointestinal No nausea, vomiting, diarrhea, constipation, cramping, dysphagia, reflux, heartburn, GI bleeding, or early satiety.  No change in bowel habits.+stomach pain   Genitourinary (M) No hematuria, dysuria, increased frequency, urgency, hesitancy or incontinence.   Musculoskeletal No joint pain, swelling or redness. No decreased range of motion.   Integumentary No chronic rashes, inflammation, ulcerations, pruritus, petechiae, purpura, ecchymoses, or skin changes.   Neurologic No headache, blurred vision, and no areas of focal weakness or numbness. Normal gait. No sensory problems.   Psychiatric No insomnia, depression, ramon or mood swings.  No psychotropic drugs.       Physical Exam:  Constitutional Alert, cooperative, oriented. Mood and affect appropriate. Appears close to chronological age. Well nourished. Well developed.   Head Normocephalic; no scars   Eyes Conjunctivae and sclerae are clear and without icterus. Pupils are reactive and equal.   ENMT Sinuses are nontender.  No oral exudates, ulcers, masses, thrush or mucositis. Oropharynx clear.  Tongue normal.   Neck Supple without masses or thyromegaly. No jugular venous distension.   Hematologic/Lymphatic No  petechiae or purpura.  No tender or palpable lymph nodes in the cervical, supraclavicular, axillary or inguinal area.   Respiratory Lungs are clear to auscultation without rhonchi or wheezing.   Cardiovascular Regular rate and rhythm of heart without murmurs, gallops or rubs.   Chest / Line Site Chest is symmetric with no chest wall deformities.   Abdomen Non-tender, non-distended, no masses, ascites or hepatosplenomegaly. Good bowel sounds. No guarding or rebound tenderness. No pulsatile masses.   Musculoskeletal No tenderness or swelling, normal range of motion without obvious weakness.   Extremities No visible deformities, no cyanosis, clubbing or edema.    Skin No rashes, scars, or lesions suggestive of malignancy. No petechiae, purpura, or ecchymoses. No excoriations.    Neurologic No sensory or motor deficits, normal cerebellar function, normal gait, cranial nerves intact.   Psychiatric Alert and oriented times three. Coherent speech. Verbalizes understanding of our discussions today.

## 2024-10-25 NOTE — H&P
See prior Consultation Note. The patient was reexamined. No interval change in the last 48hrs. High suspicion for CRC with cecal involvement as opposed to diverticulitis based on presentation.. Proceed with procedure(s) with deep sedation or conscious sedation as clinically indicated.

## 2024-10-25 NOTE — OP NOTE
NAME:  Felipe Nix   :   1965   MRN:   [id number]     Date/Time:  10/25/2024 10:32 AM    Colonoscopy Operative Report    Procedure Type:   Colonoscopy with biopsy, Pari Ink Tattooing     Indications:    Abnl CT with cecal mass, fam hx CRC  Pre-operative Diagnosis: see indication above  Post-operative Diagnosis:  See findings below  :  Gerard Varner MD  Referring Provider: -MD Camilo-No primary care provider on file.    Exam:  Airway: clear, no airway problems anticipated  Heart: RRR, without gallops or rubs  Lungs: clear bilaterally without wheezes, crackles, or rhonchi  Abdomen: soft, nontender, nondistended, bowel sounds present  Mental Status: awake, alert and oriented to person, place and time    Sedation:  MAC anesthesia Propofol 350mg IV  Procedure Details:  After informed consent was obtained with all risks and benefits of procedure explained and preoperative exam completed, the patient was taken to the endoscopy suite and placed in the left lateral decubitus position.  Upon sequential sedation as per above, a digital rectal exam was performed demonstrating internal hemorrhoids.  The Olympus videocolonoscope  was inserted in the rectum and carefully advanced to the cecum based on position although mass obliterated cecal base and appendiceal orifice.   The quality of preparation was adequate.  The colonoscope was slowly withdrawn with careful evaluation between folds. Retroflexion in the rectum was completed demonstrating internal hemorrhoids.     Findings:     -Large malignant appearing cecal mass, obliterating cecal base and appendiceal orifice, extending to ileocecal valve.  Mass is biopsied and 3 separate 1cc aliquots of Pari Ink are injected submucosally immediately distal to the mass.    -Scant sigmoid diverticulosis  -Small grade 1 internal hemorrhoids    Specimen Removed:  #1 cecal mass  Complications: None.   EBL:  None.    Impression:    -Large malignant appearing cecal mass,

## 2024-10-25 NOTE — DISCHARGE SUMMARY
PTA hydrochlorothiazide    Patient seen and examined today, vital signs are stable, lab work is at baseline and is being released in much improved condition for outpatient follow-up.  He will follow-up with Dr. Jarvis from oncology for discussion of: Biopsy results due to concerns for malignancy    Pending results: Colonic biopsy results are pending at this time and these will be followed by Dr. Jarvis and she will communicate with the patient.  I personally spoke to Dr. Jarvis from oncology about pending colon biopsy results.            Discharge Exam:  Patient seen and examined by me on discharge day.  Pertinent Findings:  Patient Vitals for the past 24 hrs:   BP Temp Temp src Pulse Resp SpO2   10/25/24 1059 137/82 98.4 °F (36.9 °C) Oral 73 18 100 %   10/25/24 1043 (!) 150/69 -- -- 72 12 100 %   10/25/24 1040 (!) 135/59 -- -- 76 18 100 %   10/25/24 1037 132/61 -- -- 77 12 99 %   10/25/24 1034 (!) 124/44 97.8 °F (36.6 °C) -- 77 16 98 %   10/25/24 1023 137/74 98.5 °F (36.9 °C) -- 88 14 100 %   10/25/24 0944 (!) 174/92 98.1 °F (36.7 °C) Temporal 76 13 98 %   10/25/24 0846 (!) 147/79 99 °F (37.2 °C) Oral 78 18 100 %   10/25/24 0301 139/67 97.8 °F (36.6 °C) Oral 75 18 95 %   10/24/24 2342 -- -- -- -- -- 100 %   10/24/24 1944 (!) 144/80 97.7 °F (36.5 °C) Oral 75 15 100 %       Gen:    Not in distress  Chest: Clear lungs  CVS:   Regular rhythm.  No edema  Abd:  Soft, not distended, not tender  Neuro: awake, moving all exts    Discharge/Recent Laboratory Results:  Recent Labs     10/25/24  0554      K 3.4*      CO2 29   BUN 9   CREATININE 0.95   GLUCOSE 77   CALCIUM 8.9     Recent Labs     10/25/24  0554   HGB 7.3*   HCT 28.2*   WBC 7.3   *       Discharge Medications:     Medication List        START taking these medications      amoxicillin-clavulanate 875-125 MG per tablet  Commonly known as: AUGMENTIN  Take 1 tablet by mouth 2 times daily for 10 days     ferrous sulfate 325 (65 Fe) MG tablet  Commonly known  prescriptions, and preparing report for sign off to her PCP) :  35 minutes

## 2024-10-25 NOTE — PROGRESS NOTES
GI Procedure Summary-    Impression:    -Large malignant appearing cecal mass, obliterating cecal base and appendiceal orifice, extending to ileocecal valve.  Mass is biopsied and 3 separate 1cc aliquots of Pari Ink are injected submucosally immediately distal to the mass.    -Scant sigmoid diverticulosis  -Small grade 1 internal hemorrhoids    Recommendations: -  -Await pathology.,   -Check CEA   -Resume Regular diet.   -He will need outpatient follow-up with Oncologist after pathology results available (will not be back until Monday afternoon or Tuesday Morning)    -Complete antibiotic course as 10d total antibiotics  -Will contact patient with result at cell [phone] or his wife, Shari at [phone]  -Resume normal medication(s).    -You will receive a letter about the biopsy results in about 10 days.  You may be asked to call your doctor's office for the results.       Will sign off as does not need to remain as IP    Gerard Varner MD

## 2024-10-25 NOTE — CARE COORDINATION
CM attempted to meet with patient. Out of room for procedure. Will attempt to see later.    CM will follow up.    Kiara Plascencia, RN  Care Manager  [phone]

## 2024-10-25 NOTE — PERIOP NOTE
Patient arrived to endoscopy unit with chart, tele box, and cell phone.    Scope pre cleaned by Doroteo PINZON    Cell phone returned to patient    TRANSFER - OUT REPORT:    Verbal report given to Chris BLACK on Felipe Nix  being transferred to [facility] for routine progression of patient care       Report consisted of patient's Situation, Background, Assessment and   Recommendations(SBAR).     Information from the following report(s) Nurse Handoff Report was reviewed with the receiving nurse.           Lines:   Peripheral IV 10/23/24 Distal;Left;Anterior Cephalic (Active)   Site Assessment Clean, dry & intact 10/25/24 0846   Line Status Flushed;Normal saline locked;Capped 10/25/24 0846   Line Care Connections checked and tightened 10/25/24 0846   Phlebitis Assessment No symptoms 10/25/24 0846   Infiltration Assessment 0 10/25/24 0846   Alcohol Cap Used Yes 10/25/24 0846   Dressing Status Clean, dry & intact 10/25/24 0846   Dressing Type Transparent 10/25/24 0846   Dressing Intervention New 10/23/24 0617        Opportunity for questions and clarification was provided.      Patient transported with:  Tele box, cell phone, patient chart

## 2024-10-25 NOTE — PROGRESS NOTES
End of Shift Note    Bedside shift change report given to Chris/WAYNE (oncoming nurse) by Madie Veliz RN (offgoing nurse).  Report included the following information SBAR REPORTS LIST: SBAR, Kardex, Intake/Output, MAR, Recent Results, and Cardiac Rhythm NSR    Shift worked:  1630-4116     Shift summary and any significant changes:     Pt.had uneventful night, AxO4, on room air,denies any pain and not in distress.NPO post midnight.For colonoscopy today.     Concerns for physician to address:       Zone phone for oncoming shift:          Activity:  Activity: Out of bed  Number times ambulated in hallways past shift: 0  Number of times OOB to chair past shift: 2    Cardiac:   Cardiac Monitoring: YES / NO: Yes        Access:  Current line(s): IV ACCESS: - PIV    Genitourinary:   Urinary status: Urinary status: Patient is voiding without difficulty.    Respiratory:   oxygen delivery: room air  Chronic home O2 use?: YES / NO: No  Incentive spirometer at bedside: YES / NO: No      GI:  Current diet:  DIET: clear  Passing flatus: YES / NO: Yes  Tolerating current diet: YES / NO: Yes      Pain Management:   Patient states pain is manageable on current regimen: YES / NO: Yes    Skin:    Interventions: JOSE MIGUEL SCALE: 22    Patient Safety:  Fall Score: FALL RISK ASSESSMENT: Not at risk for falls.  Interventions: Fall Interventions Provided: Implemented/recommended use of non-skid footwear, Implemented/recommended environmental changes (remove hazards, lower bed, improve lighting, etc.), and Implemented/recommended increased supervision/assistance      Length of Stay:  Expected LOS: 3  Actual LOS: 2      Madie Veliz RN                             Private Auto Walk in

## 2024-10-25 NOTE — DISCHARGE INSTRUCTIONS
Patient Discharge Instructions    Felipe Nix / [id number] : 1965    Admitted 10/23/2024 Discharged: 10/25/2024         DISCHARGE DIAGNOSIS:   Acute diverticulitis  Acute iron deficiency anemia due to blood loss  Hypertension      Take Home Medications     {Medication reconciliation information is now added to the patient's AVS automatically when it is printed.  There is no need to use this SmartLink in discharge instructions.  Highlight this text and delete it to clear this message}      General drug facts     If you have a very bad allergy, wear an allergy ID at all times.   It is important that you take the medication exactly as they are prescribed.   Keep your medication in the bottles provided by the pharmacist.  Keep a list of all your drugs (prescription, natural products, vitamins, OTC) with you. Give this list to your doctor.  Do not take other medications without consulting your doctor.    Do not share your drugs with others and do not take anyone else's drugs.   Keep all drugs out of the reach of children and pets.    Most drugs may be thrown away in household trash after mixing with coffee grounds or cailin litter and sealing in a plastic bag.    Keep a list Call your doctor for help with any side effects. If in the [place] you may also call the FDA at 3-108-FDA-1291    Talk with the doctor before starting any new drug, including OTC, natural products, or vitamins.        What to do at Home    1. Recommended diet: regular     2. Recommended activity: activity as tolerated    3. If you experience any of the following symptoms then please call your primary care physician or return to the emergency room if you cannot get hold of your doctor:    4. Wound Care: none     5. Lab work: cbc and bmp in 1 week     6.Bring these papers with you to your follow up appointments. The papers will help your doctors be sure to continue the care plan from the hospital.      If you have questions regarding the

## 2024-10-25 NOTE — CARE COORDINATION
CM made attempt to contact pt to complete CM initial assessment  was unsuccessful. Phone was kept ringing. As per chart pt is listed as self pay and not connected with PCP. Floor Cm will follow up.    [place] RODRIGUEZW    Ext [phone]

## 2024-10-25 NOTE — PROGRESS NOTES
Endoscopy recovery  Patient returned to baseline, vital signs stable (see vital sign flowsheet). Patient offered liquids and tolerated well. Respiratory status within defined limits. Abdomen soft not tender. Skin with in defined limits.       1047-Pt returned back to room 3219 via transport.

## 2024-10-25 NOTE — ANESTHESIA POSTPROCEDURE EVALUATION
Department of Anesthesiology  Postprocedure Note    Patient: Felipe Nix  MRN: [id number]  YOB: 1965  Date of evaluation: 10/25/2024    Procedure Summary       Date: 10/25/24 Room / Location: [facility] ENDO 01 / [facility] ENDOSCOPY    Anesthesia Start: 1004 Anesthesia Stop: 1023    Procedure: COLONOSCOPY DIAGNOSTIC (Lower GI Region) Diagnosis:       Iron deficiency anemia, unspecified iron deficiency anemia type      (Iron deficiency anemia, unspecified iron deficiency anemia type [D50.9])    Surgeons: Gerard Varner MD Responsible Provider: Noel Dove MD    Anesthesia Type: MAC ASA Status: 2            Anesthesia Type: MAC    Navid Phase I: Navid Score: 10    Navid Phase II: Navid Score: 10    Anesthesia Post Evaluation    Patient location during evaluation: PACU  Patient participation: complete - patient participated  Level of consciousness: sleepy but conscious and responsive to verbal stimuli  Pain score: 0  Airway patency: patent  Nausea & Vomiting: no vomiting and no nausea  Cardiovascular status: blood pressure returned to baseline and hemodynamically stable  Respiratory status: acceptable  Hydration status: stable    No notable events documented.

## 2024-10-25 NOTE — ANESTHESIA PRE PROCEDURE
chloride (KLOR-CON M) extended release tablet 40 mEq  40 mEq Oral PRN Shahab Padilla MD        Or   • potassium chloride (KLOR-CON) 20 MEQ packet 40 mEq  40 mEq Oral PRN Shahab Padilla MD        Or   • potassium chloride 10 mEq/100 mL IVPB (Peripheral Line)  10 mEq IntraVENous PRN Shahab Padilla MD       • magnesium sulfate 2000 mg in 50 mL IVPB premix  2,000 mg IntraVENous PRN Shahab Padilla MD       • [Held by provider] enoxaparin Sodium (LOVENOX) injection 30 mg  30 mg SubCUTAneous BID Shahab Padilla MD   30 mg at 10/24/24 0903   • ondansetron (ZOFRAN-ODT) disintegrating tablet 4 mg  4 mg Oral Q8H PRN Shahab Padilla MD        Or   • ondansetron (ZOFRAN) injection 4 mg  4 mg IntraVENous Q6H PRN Shahab Padilla MD       • polyethylene glycol (GLYCOLAX) packet 17 g  17 g Oral Daily PRN Shahab Padilla MD       • acetaminophen (TYLENOL) tablet 650 mg  650 mg Oral Q6H PRN Shahab Padilla MD        Or   • acetaminophen (TYLENOL) suppository 650 mg  650 mg Rectal Q6H PRN Shahab Padilla MD       • ampicillin-sulbactam (UNASYN) 3,000 mg in sodium chloride 0.9 % 100 mL IVPB (mini-bag)  3,000 mg IntraVENous Q6H Shahab Padilla MD   Stopped at 10/25/24 0435   • butalbital-acetaminophen-caffeine (FIORICET, ESGIC) per tablet 1 tablet  1 tablet Oral Q4H PRN Shahab Padilla MD           Allergies:  No Known Allergies    Problem List:    Patient Active Problem List   Diagnosis Code   • Diverticulitis K57.92       Past Medical History:  History reviewed. No pertinent past medical history.    Past Surgical History:  History reviewed. No pertinent surgical history.    Social History:    Social History     Tobacco Use   • Smoking status: Never   • Smokeless tobacco: Never   Substance Use Topics   • Alcohol use: Yes                                Counseling given: Not Answered      Vital Signs (Current):   Vitals:    10/24/24 1506 10/24/24 1944 10/24/24 2342 10/25/24 0301   BP: (!) 145/74 (!) 144/80  139/67   Pulse: 74 75  75   Resp: 18 15

## 2024-10-26 LAB — TRANSFERRIN SERPL-MCNC: 318 MG/DL (ref 177–329)

## 2024-10-29 ENCOUNTER — TELEPHONE (OUTPATIENT)
Age: 59
End: 2024-10-29

## 2024-10-29 NOTE — TELEPHONE ENCOUNTER
Could not lm voicemail not set up to schedule consult from faxed referral    Added to wq    NP resection of cecal adenocarcinoma, anthony duron, self pay?, notes in folder

## 2024-11-04 ENCOUNTER — HOSPITAL ENCOUNTER (OUTPATIENT)
Facility: HOSPITAL | Age: 59
Discharge: HOME | End: 2024-11-07
Payer: MEDICAID

## 2024-11-04 VITALS
WEIGHT: 259.92 LBS | OXYGEN SATURATION: 100 % | RESPIRATION RATE: 16 BRPM | TEMPERATURE: 98.1 F | DIASTOLIC BLOOD PRESSURE: 72 MMHG | SYSTOLIC BLOOD PRESSURE: 157 MMHG | BODY MASS INDEX: 34.45 KG/M2 | HEART RATE: 62 BPM | HEIGHT: 73 IN

## 2024-11-04 LAB
ABO + RH BLD: NORMAL
ALBUMIN SERPL-MCNC: 3.3 G/DL (ref 3.5–5)
ALBUMIN/GLOB SERPL: 0.7 (ref 1.1–2.2)
ALP SERPL-CCNC: 83 U/L (ref 45–117)
ALT SERPL-CCNC: 27 U/L (ref 12–78)
ANION GAP SERPL CALC-SCNC: 3 MMOL/L (ref 2–12)
APTT PPP: 25.8 SEC (ref 22.1–31)
AST SERPL-CCNC: 21 U/L (ref 15–37)
BILIRUB SERPL-MCNC: 0.8 MG/DL (ref 0.2–1)
BLOOD GROUP ANTIBODIES SERPL: NORMAL
BUN SERPL-MCNC: 14 MG/DL (ref 6–20)
BUN/CREAT SERPL: 12 (ref 12–20)
CALCIUM SERPL-MCNC: 9.1 MG/DL (ref 8.5–10.1)
CHLORIDE SERPL-SCNC: 107 MMOL/L (ref 97–108)
CO2 SERPL-SCNC: 30 MMOL/L (ref 21–32)
CREAT SERPL-MCNC: 1.19 MG/DL (ref 0.7–1.3)
ERYTHROCYTE [DISTWIDTH] IN BLOOD BY AUTOMATED COUNT: 30.5 % (ref 11.5–14.5)
EST. AVERAGE GLUCOSE BLD GHB EST-MCNC: 108 MG/DL
GLOBULIN SER CALC-MCNC: 5 G/DL (ref 2–4)
GLUCOSE SERPL-MCNC: 130 MG/DL (ref 65–100)
HBA1C MFR BLD: 5.4 % (ref 4–5.6)
HCT VFR BLD AUTO: 35.2 % (ref 36.6–50.3)
HGB BLD-MCNC: 9 G/DL (ref 12.1–17)
INR PPP: 1 (ref 0.9–1.1)
MAGNESIUM SERPL-MCNC: 2 MG/DL (ref 1.6–2.4)
MCH RBC QN AUTO: 16.9 PG (ref 26–34)
MCHC RBC AUTO-ENTMCNC: 25.6 G/DL (ref 30–36.5)
MCV RBC AUTO: 66.3 FL (ref 80–99)
NRBC # BLD: 0 K/UL (ref 0–0.01)
NRBC BLD-RTO: 0 PER 100 WBC
PHOSPHATE SERPL-MCNC: 2.1 MG/DL (ref 2.6–4.7)
PLATELET # BLD AUTO: 516 K/UL (ref 150–400)
PMV BLD AUTO: 9.5 FL (ref 8.9–12.9)
POTASSIUM SERPL-SCNC: 3.6 MMOL/L (ref 3.5–5.1)
PROT SERPL-MCNC: 8.3 G/DL (ref 6.4–8.2)
PROTHROMBIN TIME: 10.9 SEC (ref 9–11.1)
RBC # BLD AUTO: 5.31 M/UL (ref 4.1–5.7)
SODIUM SERPL-SCNC: 140 MMOL/L (ref 136–145)
SPECIMEN EXP DATE BLD: NORMAL
THERAPEUTIC RANGE: NORMAL SECS (ref 58–77)
WBC # BLD AUTO: 6.8 K/UL (ref 4.1–11.1)

## 2024-11-04 PROCEDURE — 86901 BLOOD TYPING SEROLOGIC RH(D): CPT

## 2024-11-04 PROCEDURE — 80053 COMPREHEN METABOLIC PANEL: CPT

## 2024-11-04 PROCEDURE — 85027 COMPLETE CBC AUTOMATED: CPT

## 2024-11-04 PROCEDURE — 85610 PROTHROMBIN TIME: CPT

## 2024-11-04 PROCEDURE — 93005 ELECTROCARDIOGRAM TRACING: CPT | Performed by: SURGERY

## 2024-11-04 PROCEDURE — 83036 HEMOGLOBIN GLYCOSYLATED A1C: CPT

## 2024-11-04 PROCEDURE — 84100 ASSAY OF PHOSPHORUS: CPT

## 2024-11-04 PROCEDURE — 36415 COLL VENOUS BLD VENIPUNCTURE: CPT

## 2024-11-04 PROCEDURE — 83735 ASSAY OF MAGNESIUM: CPT

## 2024-11-04 PROCEDURE — 86850 RBC ANTIBODY SCREEN: CPT

## 2024-11-04 PROCEDURE — 86900 BLOOD TYPING SEROLOGIC ABO: CPT

## 2024-11-04 PROCEDURE — 85730 THROMBOPLASTIN TIME PARTIAL: CPT

## 2024-11-04 RX ORDER — SODIUM CHLORIDE, SODIUM LACTATE, POTASSIUM CHLORIDE, CALCIUM CHLORIDE 600; 310; 30; 20 MG/100ML; MG/100ML; MG/100ML; MG/100ML
INJECTION, SOLUTION INTRAVENOUS CONTINUOUS
Status: CANCELLED | OUTPATIENT
Start: 2024-11-05

## 2024-11-04 RX ORDER — ALVIMOPAN 12 MG/1
12 CAPSULE ORAL ONCE
Status: CANCELLED | OUTPATIENT
Start: 2024-11-05 | End: 2024-11-11

## 2024-11-04 RX ORDER — ACETAMINOPHEN 500 MG
1000 TABLET ORAL ONCE
Status: CANCELLED | OUTPATIENT
Start: 2024-11-05

## 2024-11-04 RX ORDER — CELECOXIB 200 MG/1
200 CAPSULE ORAL ONCE
Status: CANCELLED | OUTPATIENT
Start: 2024-11-05

## 2024-11-04 RX ORDER — METRONIDAZOLE 500 MG/100ML
500 INJECTION, SOLUTION INTRAVENOUS ONCE
Status: CANCELLED | OUTPATIENT
Start: 2024-11-05

## 2024-11-04 NOTE — PROGRESS NOTES

## 2024-11-04 NOTE — PROGRESS NOTES
Pt reports he didn't know about the \"usual ERAS pre op medications\" - ph call made to Dr Henderson's office and spoke to Will- he suggested pt pick these medications from pharmacy on way home from [facility] \"these were phoned in\" start after  and space out by 1- 2 hours- shared this with patient- aware of other plans pre op. Reports he has RICH nagel- we reviewed high lights of this book  Cxr not done in [facility]- CT of chest completed on 10/29/24

## 2024-11-04 NOTE — PROGRESS NOTES
[facility]  Preoperative Instructions        Surgery Date November 5          Time of Arrival 0530    1. On the day of your surgery, please report to the Surgical Services Registration Desk and sign in at your designated time. The Surgery Center is located to the right of the Emergency Room.     2. You must have someone with you to drive you home. You should not drive a car for 24 hours following surgery. Please make arrangements for a friend or family member to stay with you for the first 24 hours after your surgery.    3. Refer to your handbook for instructions on drinking liquids prior to surgery.    4. We recommend you do not drink any alcoholic beverages for 24 hours before and after your surgery.    5. Contact your surgeon’s office for instructions on the following medications: non-steroidal anti-inflammatory drugs (i.e. Advil, Aleve), vitamins, and supplements. (Some surgeon’s will want you to stop these medications prior to surgery and others may allow you to take them)  **If you are currently taking Plavix, Coumadin, Aspirin and/or other blood-thinning agents, contact your surgeon for instructions.** Your surgeon will partner with the physician prescribing these medications to determine if it is safe to stop or if you need to continue taking.  Please do not stop taking these medications without instructions from your surgeon    6. Wear comfortable clothes.  Wear glasses instead of contacts.  Do not bring any money or jewelry. Please bring picture ID, insurance card, and any prearranged co-payment or hospital payment.  Do not wear make-up, particularly mascara the morning of your surgery.  Do not wear nail polish, particularly if you are having foot /hand surgery.  Wear your hair loose or down, no ponytails, buns, albert pins or clips.  All body piercings must be removed. Please do not shave for 48 hours prior to surgery. Shaving of the face is acceptable. Please see the attached

## 2024-11-05 ENCOUNTER — ANESTHESIA EVENT (OUTPATIENT)
Facility: HOSPITAL | Age: 59
End: 2024-11-05
Payer: MEDICAID

## 2024-11-05 ENCOUNTER — ANESTHESIA (OUTPATIENT)
Facility: HOSPITAL | Age: 59
End: 2024-11-05
Payer: MEDICAID

## 2024-11-05 ENCOUNTER — HOSPITAL ENCOUNTER (INPATIENT)
Facility: HOSPITAL | Age: 59
LOS: 1 days | Discharge: HOME | DRG: 231 | End: 2024-11-06
Attending: SURGERY | Admitting: SURGERY
Payer: MEDICAID

## 2024-11-05 DIAGNOSIS — C18.2 MALIGNANT NEOPLASM OF ASCENDING COLON (HCC): Primary | ICD-10-CM

## 2024-11-05 PROBLEM — C18.9 COLON CANCER (HCC): Status: ACTIVE | Noted: 2024-11-05

## 2024-11-05 LAB
BACTERIA SPEC CULT: NORMAL
BACTERIA SPEC CULT: NORMAL
SERVICE CMNT-IMP: NORMAL

## 2024-11-05 PROCEDURE — 0DTJ4ZZ RESECTION OF APPENDIX, PERCUTANEOUS ENDOSCOPIC APPROACH: ICD-10-PCS | Performed by: SURGERY

## 2024-11-05 PROCEDURE — 2500000003 HC RX 250 WO HCPCS: Performed by: REGISTERED NURSE

## 2024-11-05 PROCEDURE — 6360000002 HC RX W HCPCS: Performed by: SURGERY

## 2024-11-05 PROCEDURE — 8E0W4CZ ROBOTIC ASSISTED PROCEDURE OF TRUNK REGION, PERCUTANEOUS ENDOSCOPIC APPROACH: ICD-10-PCS | Performed by: SURGERY

## 2024-11-05 PROCEDURE — 3600000019 HC SURGERY ROBOT ADDTL 15MIN: Performed by: SURGERY

## 2024-11-05 PROCEDURE — 2500000003 HC RX 250 WO HCPCS: Performed by: ANESTHESIOLOGY

## 2024-11-05 PROCEDURE — 7100000001 HC PACU RECOVERY - ADDTL 15 MIN: Performed by: SURGERY

## 2024-11-05 PROCEDURE — 0DTH4ZZ RESECTION OF CECUM, PERCUTANEOUS ENDOSCOPIC APPROACH: ICD-10-PCS | Performed by: SURGERY

## 2024-11-05 PROCEDURE — 3700000000 HC ANESTHESIA ATTENDED CARE: Performed by: SURGERY

## 2024-11-05 PROCEDURE — 2580000003 HC RX 258: Performed by: SURGERY

## 2024-11-05 PROCEDURE — 6360000002 HC RX W HCPCS: Performed by: ANESTHESIOLOGY

## 2024-11-05 PROCEDURE — 6360000002 HC RX W HCPCS: Performed by: REGISTERED NURSE

## 2024-11-05 PROCEDURE — 0DTF4ZZ RESECTION OF RIGHT LARGE INTESTINE, PERCUTANEOUS ENDOSCOPIC APPROACH: ICD-10-PCS | Performed by: SURGERY

## 2024-11-05 PROCEDURE — 7100000000 HC PACU RECOVERY - FIRST 15 MIN: Performed by: SURGERY

## 2024-11-05 PROCEDURE — 6370000000 HC RX 637 (ALT 250 FOR IP): Performed by: SURGERY

## 2024-11-05 PROCEDURE — 1100000000 HC RM PRIVATE

## 2024-11-05 PROCEDURE — 2720000010 HC SURG SUPPLY STERILE: Performed by: SURGERY

## 2024-11-05 PROCEDURE — S2900 ROBOTIC SURGICAL SYSTEM: HCPCS | Performed by: SURGERY

## 2024-11-05 PROCEDURE — 3700000001 HC ADD 15 MINUTES (ANESTHESIA): Performed by: SURGERY

## 2024-11-05 PROCEDURE — 4A1BXSH MONITORING OF GASTROINTESTINAL VASCULAR PERFUSION USING INDOCYANINE GREEN DYE, EXTERNAL APPROACH: ICD-10-PCS | Performed by: SURGERY

## 2024-11-05 PROCEDURE — 3600000009 HC SURGERY ROBOT BASE: Performed by: SURGERY

## 2024-11-05 PROCEDURE — 2709999900 HC NON-CHARGEABLE SUPPLY: Performed by: SURGERY

## 2024-11-05 PROCEDURE — 88309 TISSUE EXAM BY PATHOLOGIST: CPT

## 2024-11-05 DEVICE — GRAFT HUM TISS W2XL12CM WHL MEM AMNIO SHT NO ANG AMINOFIX: Type: IMPLANTABLE DEVICE | Site: TRANSVERSE COLON | Status: FUNCTIONAL

## 2024-11-05 DEVICE — IMPLANTABLE DEVICE: Type: IMPLANTABLE DEVICE | Site: TRANSVERSE COLON | Status: FUNCTIONAL

## 2024-11-05 RX ORDER — NALOXONE HYDROCHLORIDE 0.4 MG/ML
INJECTION, SOLUTION INTRAMUSCULAR; INTRAVENOUS; SUBCUTANEOUS PRN
Status: DISCONTINUED | OUTPATIENT
Start: 2024-11-05 | End: 2024-11-05 | Stop reason: HOSPADM

## 2024-11-05 RX ORDER — SODIUM CHLORIDE 9 MG/ML
INJECTION, SOLUTION INTRAVENOUS PRN
Status: DISCONTINUED | OUTPATIENT
Start: 2024-11-05 | End: 2024-11-05 | Stop reason: HOSPADM

## 2024-11-05 RX ORDER — LIDOCAINE HYDROCHLORIDE ANHYDROUS AND DEXTROSE MONOHYDRATE 5; 400 G/100ML; MG/100ML
0.5 INJECTION, SOLUTION INTRAVENOUS CONTINUOUS
Status: DISCONTINUED | OUTPATIENT
Start: 2024-11-05 | End: 2024-11-06 | Stop reason: HOSPADM

## 2024-11-05 RX ORDER — ONDANSETRON 4 MG/1
4 TABLET, ORALLY DISINTEGRATING ORAL EVERY 8 HOURS PRN
Status: DISCONTINUED | OUTPATIENT
Start: 2024-11-05 | End: 2024-11-06 | Stop reason: HOSPADM

## 2024-11-05 RX ORDER — SODIUM CHLORIDE, SODIUM LACTATE, POTASSIUM CHLORIDE, CALCIUM CHLORIDE 600; 310; 30; 20 MG/100ML; MG/100ML; MG/100ML; MG/100ML
INJECTION, SOLUTION INTRAVENOUS CONTINUOUS
Status: DISCONTINUED | OUTPATIENT
Start: 2024-11-05 | End: 2024-11-06

## 2024-11-05 RX ORDER — ONDANSETRON 2 MG/ML
INJECTION INTRAMUSCULAR; INTRAVENOUS
Status: DISCONTINUED | OUTPATIENT
Start: 2024-11-05 | End: 2024-11-05 | Stop reason: SDUPTHER

## 2024-11-05 RX ORDER — DEXAMETHASONE SODIUM PHOSPHATE 4 MG/ML
4 INJECTION, SOLUTION INTRA-ARTICULAR; INTRALESIONAL; INTRAMUSCULAR; INTRAVENOUS; SOFT TISSUE
Status: DISCONTINUED | OUTPATIENT
Start: 2024-11-05 | End: 2024-11-05 | Stop reason: HOSPADM

## 2024-11-05 RX ORDER — FENTANYL CITRATE 50 UG/ML
INJECTION, SOLUTION INTRAMUSCULAR; INTRAVENOUS
Status: DISCONTINUED | OUTPATIENT
Start: 2024-11-05 | End: 2024-11-05 | Stop reason: SDUPTHER

## 2024-11-05 RX ORDER — BUPIVACAINE HYDROCHLORIDE 2.5 MG/ML
INJECTION, SOLUTION EPIDURAL; INFILTRATION; INTRACAUDAL PRN
Status: DISCONTINUED | OUTPATIENT
Start: 2024-11-05 | End: 2024-11-05 | Stop reason: ALTCHOICE

## 2024-11-05 RX ORDER — LIDOCAINE HYDROCHLORIDE 20 MG/ML
INJECTION, SOLUTION EPIDURAL; INFILTRATION; INTRACAUDAL; PERINEURAL
Status: DISCONTINUED | OUTPATIENT
Start: 2024-11-05 | End: 2024-11-05 | Stop reason: SDUPTHER

## 2024-11-05 RX ORDER — HYDROCHLOROTHIAZIDE 12.5 MG/1
25 CAPSULE ORAL DAILY
Status: DISCONTINUED | OUTPATIENT
Start: 2024-11-06 | End: 2024-11-06 | Stop reason: HOSPADM

## 2024-11-05 RX ORDER — SODIUM CHLORIDE 0.9 % (FLUSH) 0.9 %
5-40 SYRINGE (ML) INJECTION PRN
Status: DISCONTINUED | OUTPATIENT
Start: 2024-11-05 | End: 2024-11-05 | Stop reason: HOSPADM

## 2024-11-05 RX ORDER — SODIUM CHLORIDE 0.9 % (FLUSH) 0.9 %
5-40 SYRINGE (ML) INJECTION EVERY 12 HOURS SCHEDULED
Status: DISCONTINUED | OUTPATIENT
Start: 2024-11-05 | End: 2024-11-05 | Stop reason: HOSPADM

## 2024-11-05 RX ORDER — SODIUM CHLORIDE, SODIUM LACTATE, POTASSIUM CHLORIDE, CALCIUM CHLORIDE 600; 310; 30; 20 MG/100ML; MG/100ML; MG/100ML; MG/100ML
INJECTION, SOLUTION INTRAVENOUS CONTINUOUS
Status: DISCONTINUED | OUTPATIENT
Start: 2024-11-05 | End: 2024-11-05 | Stop reason: HOSPADM

## 2024-11-05 RX ORDER — SODIUM CHLORIDE 0.9 % (FLUSH) 0.9 %
5-40 SYRINGE (ML) INJECTION PRN
Status: DISCONTINUED | OUTPATIENT
Start: 2024-11-05 | End: 2024-11-06 | Stop reason: HOSPADM

## 2024-11-05 RX ORDER — MAGNESIUM SULFATE HEPTAHYDRATE 40 MG/ML
INJECTION, SOLUTION INTRAVENOUS
Status: DISCONTINUED | OUTPATIENT
Start: 2024-11-05 | End: 2024-11-05 | Stop reason: SDUPTHER

## 2024-11-05 RX ORDER — SODIUM CHLORIDE 0.9 % (FLUSH) 0.9 %
5-40 SYRINGE (ML) INJECTION EVERY 12 HOURS SCHEDULED
Status: DISCONTINUED | OUTPATIENT
Start: 2024-11-05 | End: 2024-11-06 | Stop reason: HOSPADM

## 2024-11-05 RX ORDER — OXYCODONE HYDROCHLORIDE 5 MG/1
5 TABLET ORAL EVERY 4 HOURS PRN
Status: DISCONTINUED | OUTPATIENT
Start: 2024-11-05 | End: 2024-11-06 | Stop reason: HOSPADM

## 2024-11-05 RX ORDER — FENTANYL CITRATE 50 UG/ML
100 INJECTION, SOLUTION INTRAMUSCULAR; INTRAVENOUS
Status: DISCONTINUED | OUTPATIENT
Start: 2024-11-05 | End: 2024-11-05 | Stop reason: HOSPADM

## 2024-11-05 RX ORDER — HYDRALAZINE HYDROCHLORIDE 20 MG/ML
10 INJECTION INTRAMUSCULAR; INTRAVENOUS
Status: DISCONTINUED | OUTPATIENT
Start: 2024-11-05 | End: 2024-11-05 | Stop reason: HOSPADM

## 2024-11-05 RX ORDER — SODIUM CHLORIDE 9 MG/ML
INJECTION, SOLUTION INTRAVENOUS PRN
Status: DISCONTINUED | OUTPATIENT
Start: 2024-11-05 | End: 2024-11-06 | Stop reason: HOSPADM

## 2024-11-05 RX ORDER — ALVIMOPAN 12 MG/1
12 CAPSULE ORAL ONCE
Status: COMPLETED | OUTPATIENT
Start: 2024-11-05 | End: 2024-11-05

## 2024-11-05 RX ORDER — KETOROLAC TROMETHAMINE 30 MG/ML
30 INJECTION, SOLUTION INTRAMUSCULAR; INTRAVENOUS EVERY 6 HOURS
Status: DISCONTINUED | OUTPATIENT
Start: 2024-11-05 | End: 2024-11-06 | Stop reason: HOSPADM

## 2024-11-05 RX ORDER — ENOXAPARIN SODIUM 100 MG/ML
30 INJECTION SUBCUTANEOUS 2 TIMES DAILY
Status: DISCONTINUED | OUTPATIENT
Start: 2024-11-06 | End: 2024-11-06 | Stop reason: HOSPADM

## 2024-11-05 RX ORDER — DEXAMETHASONE SODIUM PHOSPHATE 4 MG/ML
INJECTION, SOLUTION INTRA-ARTICULAR; INTRALESIONAL; INTRAMUSCULAR; INTRAVENOUS; SOFT TISSUE
Status: DISCONTINUED | OUTPATIENT
Start: 2024-11-05 | End: 2024-11-05 | Stop reason: SDUPTHER

## 2024-11-05 RX ORDER — ACETAMINOPHEN 325 MG/1
650 TABLET ORAL
Status: DISCONTINUED | OUTPATIENT
Start: 2024-11-05 | End: 2024-11-05 | Stop reason: HOSPADM

## 2024-11-05 RX ORDER — ONDANSETRON 2 MG/ML
4 INJECTION INTRAMUSCULAR; INTRAVENOUS EVERY 6 HOURS PRN
Status: DISCONTINUED | OUTPATIENT
Start: 2024-11-05 | End: 2024-11-06 | Stop reason: HOSPADM

## 2024-11-05 RX ORDER — PROCHLORPERAZINE EDISYLATE 5 MG/ML
5 INJECTION INTRAMUSCULAR; INTRAVENOUS
Status: DISCONTINUED | OUTPATIENT
Start: 2024-11-05 | End: 2024-11-05 | Stop reason: HOSPADM

## 2024-11-05 RX ORDER — DEXMEDETOMIDINE HYDROCHLORIDE 100 UG/ML
INJECTION, SOLUTION INTRAVENOUS
Status: DISCONTINUED | OUTPATIENT
Start: 2024-11-05 | End: 2024-11-05 | Stop reason: SDUPTHER

## 2024-11-05 RX ORDER — OXYCODONE HYDROCHLORIDE 5 MG/1
5 TABLET ORAL
Status: DISCONTINUED | OUTPATIENT
Start: 2024-11-05 | End: 2024-11-05 | Stop reason: HOSPADM

## 2024-11-05 RX ORDER — ALVIMOPAN 12 MG/1
12 CAPSULE ORAL 2 TIMES DAILY
Status: DISCONTINUED | OUTPATIENT
Start: 2024-11-06 | End: 2024-11-06 | Stop reason: HOSPADM

## 2024-11-05 RX ORDER — CELECOXIB 200 MG/1
200 CAPSULE ORAL ONCE
Status: COMPLETED | OUTPATIENT
Start: 2024-11-05 | End: 2024-11-05

## 2024-11-05 RX ORDER — LIDOCAINE HYDROCHLORIDE 20 MG/ML
INJECTION, SOLUTION EPIDURAL; INFILTRATION; INTRACAUDAL; PERINEURAL
Status: DISCONTINUED | OUTPATIENT
Start: 2024-11-05 | End: 2024-11-05

## 2024-11-05 RX ORDER — METRONIDAZOLE 500 MG/100ML
500 INJECTION, SOLUTION INTRAVENOUS ONCE
Status: COMPLETED | OUTPATIENT
Start: 2024-11-05 | End: 2024-11-05

## 2024-11-05 RX ORDER — ACETAMINOPHEN 500 MG
1000 TABLET ORAL ONCE
Status: DISCONTINUED | OUTPATIENT
Start: 2024-11-05 | End: 2024-11-05 | Stop reason: HOSPADM

## 2024-11-05 RX ORDER — PROPOFOL 10 MG/ML
INJECTION, EMULSION INTRAVENOUS
Status: DISCONTINUED | OUTPATIENT
Start: 2024-11-05 | End: 2024-11-05 | Stop reason: SDUPTHER

## 2024-11-05 RX ORDER — FERROUS SULFATE 325(65) MG
325 TABLET ORAL
Status: DISCONTINUED | OUTPATIENT
Start: 2024-11-06 | End: 2024-11-06 | Stop reason: HOSPADM

## 2024-11-05 RX ORDER — MIDAZOLAM HYDROCHLORIDE 2 MG/2ML
2 INJECTION, SOLUTION INTRAMUSCULAR; INTRAVENOUS
Status: DISCONTINUED | OUTPATIENT
Start: 2024-11-05 | End: 2024-11-05 | Stop reason: HOSPADM

## 2024-11-05 RX ORDER — MIDAZOLAM HYDROCHLORIDE 1 MG/ML
INJECTION, SOLUTION INTRAMUSCULAR; INTRAVENOUS
Status: DISCONTINUED | OUTPATIENT
Start: 2024-11-05 | End: 2024-11-05 | Stop reason: SDUPTHER

## 2024-11-05 RX ORDER — ROCURONIUM BROMIDE 10 MG/ML
INJECTION, SOLUTION INTRAVENOUS
Status: DISCONTINUED | OUTPATIENT
Start: 2024-11-05 | End: 2024-11-05 | Stop reason: SDUPTHER

## 2024-11-05 RX ORDER — HYDROMORPHONE HYDROCHLORIDE 1 MG/ML
0.25 INJECTION, SOLUTION INTRAMUSCULAR; INTRAVENOUS; SUBCUTANEOUS EVERY 5 MIN PRN
Status: DISCONTINUED | OUTPATIENT
Start: 2024-11-05 | End: 2024-11-05 | Stop reason: HOSPADM

## 2024-11-05 RX ORDER — ONDANSETRON 2 MG/ML
4 INJECTION INTRAMUSCULAR; INTRAVENOUS ONCE
Status: COMPLETED | OUTPATIENT
Start: 2024-11-05 | End: 2024-11-05

## 2024-11-05 RX ORDER — OXYCODONE HYDROCHLORIDE 5 MG/1
10 TABLET ORAL EVERY 4 HOURS PRN
Status: DISCONTINUED | OUTPATIENT
Start: 2024-11-05 | End: 2024-11-06 | Stop reason: HOSPADM

## 2024-11-05 RX ORDER — LIDOCAINE HYDROCHLORIDE ANHYDROUS AND DEXTROSE MONOHYDRATE 5; 400 G/100ML; MG/100ML
INJECTION, SOLUTION INTRAVENOUS
Status: DISCONTINUED | OUTPATIENT
Start: 2024-11-05 | End: 2024-11-05 | Stop reason: SDUPTHER

## 2024-11-05 RX ORDER — INDOCYANINE GREEN AND WATER 25 MG
KIT INJECTION
Status: DISCONTINUED | OUTPATIENT
Start: 2024-11-05 | End: 2024-11-05 | Stop reason: SDUPTHER

## 2024-11-05 RX ORDER — FENTANYL CITRATE 50 UG/ML
25 INJECTION, SOLUTION INTRAMUSCULAR; INTRAVENOUS EVERY 5 MIN PRN
Status: COMPLETED | OUTPATIENT
Start: 2024-11-05 | End: 2024-11-05

## 2024-11-05 RX ORDER — ACETAMINOPHEN 500 MG
1000 TABLET ORAL ONCE
Status: COMPLETED | OUTPATIENT
Start: 2024-11-05 | End: 2024-11-05

## 2024-11-05 RX ADMIN — Medication 20 MG: at 08:03

## 2024-11-05 RX ADMIN — FENTANYL CITRATE 25 MCG: 50 INJECTION INTRAMUSCULAR; INTRAVENOUS at 10:05

## 2024-11-05 RX ADMIN — ROCURONIUM BROMIDE 50 MG: 10 INJECTION INTRAVENOUS at 07:35

## 2024-11-05 RX ADMIN — HYDROMORPHONE HYDROCHLORIDE 0.5 MG: 1 INJECTION, SOLUTION INTRAMUSCULAR; INTRAVENOUS; SUBCUTANEOUS at 14:42

## 2024-11-05 RX ADMIN — METRONIDAZOLE 500 MG: 500 INJECTION, SOLUTION INTRAVENOUS at 07:42

## 2024-11-05 RX ADMIN — HYDROMORPHONE HYDROCHLORIDE 0.4 MG: 1 INJECTION, SOLUTION INTRAMUSCULAR; INTRAVENOUS; SUBCUTANEOUS at 09:25

## 2024-11-05 RX ADMIN — SUGAMMADEX 200 MG: 100 INJECTION, SOLUTION INTRAVENOUS at 09:17

## 2024-11-05 RX ADMIN — INDOCYANINE GREEN 7.5 MG: KIT INTRAVENOUS at 08:21

## 2024-11-05 RX ADMIN — HYDROMORPHONE HYDROCHLORIDE 0.2 MG: 1 INJECTION, SOLUTION INTRAMUSCULAR; INTRAVENOUS; SUBCUTANEOUS at 09:13

## 2024-11-05 RX ADMIN — Medication 30 MG: at 07:40

## 2024-11-05 RX ADMIN — ONDANSETRON 4 MG: 2 INJECTION INTRAMUSCULAR; INTRAVENOUS at 08:48

## 2024-11-05 RX ADMIN — PROPOFOL 50 MG: 10 INJECTION, EMULSION INTRAVENOUS at 08:06

## 2024-11-05 RX ADMIN — SODIUM CHLORIDE, POTASSIUM CHLORIDE, SODIUM LACTATE AND CALCIUM CHLORIDE: 600; 310; 30; 20 INJECTION, SOLUTION INTRAVENOUS at 15:40

## 2024-11-05 RX ADMIN — MAGNESIUM SULFATE IN WATER 2000 MG: 40 INJECTION, SOLUTION INTRAVENOUS at 07:56

## 2024-11-05 RX ADMIN — KETOROLAC TROMETHAMINE 30 MG: 30 INJECTION, SOLUTION INTRAMUSCULAR at 22:53

## 2024-11-05 RX ADMIN — FENTANYL CITRATE 25 MCG: 50 INJECTION INTRAMUSCULAR; INTRAVENOUS at 09:50

## 2024-11-05 RX ADMIN — ACETAMINOPHEN 1000 MG: 500 TABLET ORAL at 06:42

## 2024-11-05 RX ADMIN — FENTANYL CITRATE 50 MCG: 50 INJECTION, SOLUTION INTRAMUSCULAR; INTRAVENOUS at 07:44

## 2024-11-05 RX ADMIN — WATER 2000 MG: 1 INJECTION INTRAMUSCULAR; INTRAVENOUS; SUBCUTANEOUS at 07:42

## 2024-11-05 RX ADMIN — DEXMEDETOMIDINE 6 MCG: 100 INJECTION, SOLUTION INTRAVENOUS at 08:08

## 2024-11-05 RX ADMIN — ROCURONIUM BROMIDE 10 MG: 10 INJECTION INTRAVENOUS at 08:43

## 2024-11-05 RX ADMIN — PROPOFOL 50 MG: 10 INJECTION, EMULSION INTRAVENOUS at 07:35

## 2024-11-05 RX ADMIN — HYDROMORPHONE HYDROCHLORIDE 0.2 MG: 1 INJECTION, SOLUTION INTRAMUSCULAR; INTRAVENOUS; SUBCUTANEOUS at 09:21

## 2024-11-05 RX ADMIN — LIDOCAINE HYDROCHLORIDE 2 MG/KG/HR: 4 INJECTION, SOLUTION INTRAVENOUS at 07:42

## 2024-11-05 RX ADMIN — CELECOXIB 200 MG: 200 CAPSULE ORAL at 06:42

## 2024-11-05 RX ADMIN — PROPOFOL 150 MG: 10 INJECTION, EMULSION INTRAVENOUS at 07:34

## 2024-11-05 RX ADMIN — HYDROMORPHONE HYDROCHLORIDE 0.2 MG: 1 INJECTION, SOLUTION INTRAMUSCULAR; INTRAVENOUS; SUBCUTANEOUS at 09:00

## 2024-11-05 RX ADMIN — HYDROMORPHONE HYDROCHLORIDE 0.25 MG: 1 INJECTION, SOLUTION INTRAMUSCULAR; INTRAVENOUS; SUBCUTANEOUS at 11:35

## 2024-11-05 RX ADMIN — FENTANYL CITRATE 25 MCG: 50 INJECTION INTRAMUSCULAR; INTRAVENOUS at 09:35

## 2024-11-05 RX ADMIN — FENTANYL CITRATE 25 MCG: 50 INJECTION INTRAMUSCULAR; INTRAVENOUS at 09:40

## 2024-11-05 RX ADMIN — SODIUM CHLORIDE, POTASSIUM CHLORIDE, SODIUM LACTATE AND CALCIUM CHLORIDE: 600; 310; 30; 20 INJECTION, SOLUTION INTRAVENOUS at 06:55

## 2024-11-05 RX ADMIN — ONDANSETRON 4 MG: 2 INJECTION INTRAMUSCULAR; INTRAVENOUS at 06:48

## 2024-11-05 RX ADMIN — FENTANYL CITRATE 50 MCG: 50 INJECTION, SOLUTION INTRAMUSCULAR; INTRAVENOUS at 07:33

## 2024-11-05 RX ADMIN — HYDROMORPHONE HYDROCHLORIDE 0.25 MG: 1 INJECTION, SOLUTION INTRAMUSCULAR; INTRAVENOUS; SUBCUTANEOUS at 11:30

## 2024-11-05 RX ADMIN — DEXAMETHASONE SODIUM PHOSPHATE 8 MG: 4 INJECTION INTRA-ARTICULAR; INTRALESIONAL; INTRAMUSCULAR; INTRAVENOUS; SOFT TISSUE at 08:15

## 2024-11-05 RX ADMIN — LIDOCAINE HYDROCHLORIDE 100 MG: 20 INJECTION, SOLUTION EPIDURAL; INFILTRATION; INTRACAUDAL; PERINEURAL at 07:33

## 2024-11-05 RX ADMIN — MIDAZOLAM HYDROCHLORIDE 2 MG: 1 INJECTION, SOLUTION INTRAMUSCULAR; INTRAVENOUS at 07:27

## 2024-11-05 RX ADMIN — KETOROLAC TROMETHAMINE 30 MG: 30 INJECTION, SOLUTION INTRAMUSCULAR at 16:42

## 2024-11-05 RX ADMIN — ALVIMOPAN 12 MG: 12 CAPSULE ORAL at 06:42

## 2024-11-05 ASSESSMENT — PAIN DESCRIPTION - DESCRIPTORS
DESCRIPTORS: DISCOMFORT
DESCRIPTORS: ACHING
DESCRIPTORS: ACHING
DESCRIPTORS: SORE;DISCOMFORT
DESCRIPTORS: DISCOMFORT
DESCRIPTORS: SORE
DESCRIPTORS: SORE;DISCOMFORT

## 2024-11-05 ASSESSMENT — PAIN DESCRIPTION - LOCATION
LOCATION: ABDOMEN

## 2024-11-05 ASSESSMENT — PAIN - FUNCTIONAL ASSESSMENT
PAIN_FUNCTIONAL_ASSESSMENT: 0-10
PAIN_FUNCTIONAL_ASSESSMENT: ACTIVITIES ARE NOT PREVENTED

## 2024-11-05 ASSESSMENT — PAIN SCALES - GENERAL
PAINLEVEL_OUTOF10: 1
PAINLEVEL_OUTOF10: 2
PAINLEVEL_OUTOF10: 10
PAINLEVEL_OUTOF10: 1
PAINLEVEL_OUTOF10: 10
PAINLEVEL_OUTOF10: 1
PAINLEVEL_OUTOF10: 9
PAINLEVEL_OUTOF10: 3
PAINLEVEL_OUTOF10: 10
PAINLEVEL_OUTOF10: 8

## 2024-11-05 ASSESSMENT — PAIN DESCRIPTION - PAIN TYPE
TYPE: SURGICAL PAIN
TYPE: SURGICAL PAIN

## 2024-11-05 ASSESSMENT — PAIN DESCRIPTION - ORIENTATION
ORIENTATION: MID;LEFT
ORIENTATION: MID;LEFT
ORIENTATION: ANTERIOR
ORIENTATION: ANTERIOR
ORIENTATION: MID;LEFT

## 2024-11-05 NOTE — CARE COORDINATION
Care Management Initial Assessment       RUR:   Readmission? Yes -   1st IM letter given? No  1st  letter given: No     CM completed assessment w/pt at bedside.  No history of HH or DME use.  Patient is independent w/ADL to include driving.  [facility] uses [facility] [address].  Patient agreed to have a new PCP set up.  CM sent referral to CM assistant.  No other needs or concerns identified at this time.  Patients significant other, Karoline will transport at d/c.       11/05/24 1503   Service Assessment   Patient Orientation Alert and Oriented   Cognition Alert   History Provided By Patient   Primary Caregiver Self   Support Systems Spouse/Significant Other;Children  (four sons, significant other)   PCP Verified by CM No  (pt would like new PCP set up)   Prior Functional Level Independent in ADLs/IADLs   Current Functional Level Independent in ADLs/IADLs   Can patient return to prior living arrangement Yes   Family able to assist with home care needs: Yes   Would you like for me to discuss the discharge plan with any other family members/significant others, and if so, who? No   Financial Resources Medicaid   Community Resources None   Social/Functional History   Lives With Significant other   Type of Home House  (four story home w/4 ADOLPH)   Home Equipment None   Active  Yes     Readmission Assessment  Number of Days since last admission?: 8-30 days  Previous Disposition: Home with Family  Who is being Interviewed: Patient  What was the patient's/caregiver's perception as to why they think they needed to return back to the hospital?: Other (Comment) (severe abdominal pain)  Did you visit your Primary Care Physician after you left the hospital, before you returned this time?: No  Why weren't you able to visit your PCP?: Other (Comment) (did not have a PCP)  Did you see a specialist, such as Cardiac, Pulmonary, Orthopedic Physician, etc. after you left the hospital?: Yes  Who advised the patient to return to the  hospital?: Self-referral  Does the patient report anything that got in the way of taking their medications?: No  In our efforts to provide the best possible care to you and others like you, can you think of anything that we could have done to help you after you left the hospital the first time, so that you might not have needed to return so soon?: Other (Comment) (n/a)     Alida Jones  Ext [phone]

## 2024-11-05 NOTE — CONSULTS
Nutrition Note    Chart reviewed; RD provided a 5-day supply of Ensure Surgery/Immunonutrition (10 bottles) to the bedside per ERAS protocol and discussed the importance of adequate nutrition/supplement compliance in effort to optimize wound healing and recovery from surgery. Pt agreeable. RD anticipates good compliance.      Electronically signed by Antonieta Falk RD on 11/5/24 at 2:38 PM EST    Contact: [phone]

## 2024-11-05 NOTE — ANESTHESIA PRE PROCEDURE
Department of Anesthesiology  Preprocedure Note       Name:  Felipe Nix   Age:  59 y.o.  :  1965                                          MRN:  [id number]         Date:  2024      Surgeon: Surgeon(s):  Torrey Henderson II, MD    Procedure: Procedure(s):  ROBOTIC RIGHT COLECTOMY (E R A S)    Medications prior to admission:   Prior to Admission medications    Medication Sig Start Date End Date Taking? Authorizing Provider   L.acid,para-B.bifidum-S.therm (RISAQUAD) CAPS cap Take 1 capsule by mouth daily  Patient not taking: Reported on 2024 10/25/24   Luis Enrique Frias MD   ferrous sulfate (IRON 325) 325 (65 Fe) MG tablet Take 1 tablet by mouth daily (with breakfast) 10/25/24   Luis Enrique Frias MD   acetaminophen (TYLENOL) 325 MG tablet Take 650 mg by mouth every 4 hours as needed 22   Automatic Reconciliation, Ar   butalbital-APAP-caffeine -40 MG CAPS per capsule Take 1 capsule by mouth every 4 hours as needed  Patient not taking: Reported on 2024   Automatic Reconciliation, Ar   hydroCHLOROthiazide (MICROZIDE) 12.5 MG capsule Take 2 capsules by mouth daily    Automatic Reconciliation, Ar   lidocaine (LIDODERM) 5 % Apply patch to the affected area for 12 hours a day and remove for 12 hours a day.  Patient not taking: Reported on 2024   Automatic Reconciliation, Ar   mupirocin (BACTROBAN) 2 % cream Apply topically 2 times daily  Patient not taking: Reported on 2024 12/15/19   Automatic Reconciliation, Ar       Current medications:    No current facility-administered medications for this encounter.       Allergies:  No Known Allergies    Problem List:    Patient Active Problem List   Diagnosis Code   • Diverticulitis K57.92       Past Medical History:        Diagnosis Date   • At risk for sleep apnea 2024    PRECIOUS 5       Past Surgical History:        Procedure Laterality Date   • COLONOSCOPY N/A 10/25/2024    COLONOSCOPY DIAGNOSTIC performed by Gerard Varner,

## 2024-11-05 NOTE — PROGRESS NOTES
End of Shift Note    Bedside shift change report given to WAYNE Rodriguez (oncoming nurse) by Serafin Kim RN (offgoing nurse).  Report included the following information SBAR, Kardex, OR Summary, Procedure Summary, Intake/Output, MAR, and Recent Results    Shift worked:  7a-730p     Shift summary and any significant changes:     Admitted pt this afternoon. Treated pain prn-see MAR. Garza draining clear-yellow urine. Incisions C/D/I. Tolerating diet.      Concerns for physician to address:       Zone phone for oncoming shift:   [phone]       Activity:     Number times ambulated in hallways past shift: 0  Number of times OOB to chair past shift: 0    Cardiac:   Cardiac Monitoring: No           Access:   Current line(s): Peripheral IV    Genitourinary:   Urinary status: Indwelling catheter is draining well.    Respiratory:      Chronic home O2 use?: NO  Incentive spirometer at bedside: YES       GI:     Current diet:  ADULT DIET; Regular; Low Fiber  Passing flatus: No  Tolerating current diet: Yes       Pain Management:   Patient states pain is manageable on current regimen: Yes    Skin:     Interventions:     Patient Safety:  Fall Score: PA Fall Risk Score: 48.49    Interventions:           Length of Stay:  Expected LOS: 3  Actual LOS: 0      Serafin Kim RN

## 2024-11-05 NOTE — PROGRESS NOTES
Spiritual Health History and Assessment/Progress Note  [facility]    Pre-Op,  ,  ,      Name: Felipe Nix MRN: [id number]    Age: 59 y.o.     Sex: male   Language: English   Jehovah's witness: Mandaen   <principal problem not specified>     Date: 11/5/2024            Total Time Calculated: 8 min              Spiritual Assessment began in [facility]        Referral/Consult From: Rounding   Encounter Overview/Reason: Pre-Op  Service Provided For: Patient    Adina, Belief, Meaning:   Patient identifies as spiritual, is connected with a adina tradition or spiritual practice, and has beliefs or practices that help with coping during difficult times  Family/Friends No family/friends present      Importance and Influence:  Patient has spiritual/personal beliefs that influence decisions regarding their health  Family/Friends No family/friends present    Community:  Patient feels well-supported. Support system includes: Friends and Extended family  Family/Friends No family/friends present    Assessment and Plan of Care:     Patient Interventions include: Facilitated expression of thoughts and feelings, Explored spiritual coping/struggle/distress, and Engaged in theological reflection  Family/Friends Interventions include: No family/friends present    Patient Plan of Care: Spiritual Care available upon further referral  Family/Friends Plan of Care: Spiritual Care available upon further referral    Electronically signed by Chaplain Evon on 11/5/2024 at 7:53 AM

## 2024-11-05 NOTE — PERIOP NOTE
06:17= ambulated independently to Pre Op; A&Ox4, consents verified (3); changed into gown with NO CHG; states he adhered to PAT guidelines for cleansing; voided in BR; mepilex dsg applied to sacrum; no erythema or skin breakdown noted; skin CDI; declined warming blanket. Pt states \"I took 3 pills that the dr gave me to take before surgery yesterday\"; doesn't know names.    06:52=  in to pray with pt.    06:56= ready for OR; call bell in reach; music therapy (R&B Soul playing) to calm prior to surgery.    07:10= Dr. Henderson in to discuss surgery.

## 2024-11-05 NOTE — ANESTHESIA POSTPROCEDURE EVALUATION
Department of Anesthesiology  Postprocedure Note    Patient: Felipe Nix  MRN: [id number]  YOB: 1965  Date of evaluation: 11/5/2024    Procedure Summary       Date: 11/05/24 Room / Location: [facility] M9 / [facility] OR    Anesthesia Start: 0727 Anesthesia Stop: 0931    Procedure: ROBOTIC RIGHT COLECTOMY (E R A S) (Right: Abdomen) Diagnosis:       Malignant neoplasm of colon, unspecified part of colon (HCC)      (Malignant neoplasm of colon, unspecified part of colon (HCC) [C18.9])    Providers: Torrey Henderson II, MD Responsible Provider: Alfredo Gallagher MD    Anesthesia Type: General ASA Status: 2            Anesthesia Type: General    Navid Phase I: Navid Score: 9    Navid Phase II:      Anesthesia Post Evaluation    Patient location during evaluation: PACU  Patient participation: complete - patient participated  Level of consciousness: sleepy but conscious and responsive to verbal stimuli  Pain score: 2  Airway patency: patent  Nausea & Vomiting: no vomiting and no nausea  Cardiovascular status: blood pressure returned to baseline and hemodynamically stable  Respiratory status: acceptable  Hydration status: stable  Multimodal analgesia pain management approach  Pain management: adequate    No notable events documented.

## 2024-11-05 NOTE — OP NOTE
Operative Report    Patient: Felipe Nix MRN: [id number]  SSN: [id number]    YOB: 1965  Age: 59 y.o.  Sex: male       Date of Surgery: 11/05/24     Preoperative Diagnosis: Malignant neoplasm of colon, unspecified part of colon (HCC) [C18.9]     Postoperative Diagnosis:  Same     Surgeons and Role:     * Torrey Henderson II, MD - Primary    Anesthesia: General     Procedure: Robotic assisted laparoscopic right colectomy     Operation performed with curative intent: Yes  Tumor location: Ascending colon  Extent of colon and vascular resection: Right hemicolectomy - ileocolic, right colic (if present)    Procedure in Detail:   The patient was taken to the operating suite and placed in the supine position.  General endotracheal anesthesia was induced.  A grove catheter an SCDs were placed.  The abdomen was prepped and draped in the usual sterile fashion.  A timeout was performed as per hospital protocol.    A Verress needle was inserted into the abdominal cavity through an 8mm incision in the left upper quadrant and the abdomen was insufflated.  An 8mm robotic trocar was inserted under direct visualization using a 5mm scope.  Two more 8mm robotic trocars were placed in the periumbilical area and right lower quadrant.  A 12mm trocar was inserted in the left upper quadrant and a 5mm assistant port was placed in the left mid abdomen.  It was through these all of these sites that the entirety of the procedure was completed.    The patient was placed in the slight Trendelenburg position with right side up and the robot was docked.  A tattoo crys was noted in the distal ascending colon.  The cecum was elevated identifying the pedicle for the ileocolic artery and vein.  The mesentery was scored along the medial edge and access was gained into the avascular plane between the right colon mesentery and the retroperitoneum.  The duodenum and right ureter were identified and injury was avoided during this part of

## 2024-11-06 VITALS
DIASTOLIC BLOOD PRESSURE: 93 MMHG | BODY MASS INDEX: 34.62 KG/M2 | SYSTOLIC BLOOD PRESSURE: 164 MMHG | WEIGHT: 261.25 LBS | HEART RATE: 81 BPM | HEIGHT: 73 IN | RESPIRATION RATE: 18 BRPM | TEMPERATURE: 98.1 F | OXYGEN SATURATION: 100 %

## 2024-11-06 LAB
ANION GAP SERPL CALC-SCNC: 5 MMOL/L (ref 2–12)
BASOPHILS # BLD: 0 K/UL (ref 0–0.1)
BASOPHILS NFR BLD: 0 % (ref 0–1)
BUN SERPL-MCNC: 12 MG/DL (ref 6–20)
BUN/CREAT SERPL: 12 (ref 12–20)
CALCIUM SERPL-MCNC: 8.3 MG/DL (ref 8.5–10.1)
CHLORIDE SERPL-SCNC: 108 MMOL/L (ref 97–108)
CO2 SERPL-SCNC: 25 MMOL/L (ref 21–32)
CREAT SERPL-MCNC: 0.97 MG/DL (ref 0.7–1.3)
DIFFERENTIAL METHOD BLD: ABNORMAL
EKG ATRIAL RATE: 59 BPM
EKG DIAGNOSIS: NORMAL
EKG P AXIS: 46 DEGREES
EKG P-R INTERVAL: 134 MS
EKG Q-T INTERVAL: 388 MS
EKG QRS DURATION: 86 MS
EKG QTC CALCULATION (BAZETT): 384 MS
EKG R AXIS: 24 DEGREES
EKG T AXIS: 156 DEGREES
EKG VENTRICULAR RATE: 59 BPM
EOSINOPHIL # BLD: 0 K/UL (ref 0–0.4)
EOSINOPHIL NFR BLD: 0 % (ref 0–7)
ERYTHROCYTE [DISTWIDTH] IN BLOOD BY AUTOMATED COUNT: 30.9 % (ref 11.5–14.5)
GLUCOSE SERPL-MCNC: 146 MG/DL (ref 65–100)
HCT VFR BLD AUTO: 30.3 % (ref 36.6–50.3)
HGB BLD-MCNC: 8 G/DL (ref 12.1–17)
IMM GRANULOCYTES # BLD AUTO: 0 K/UL (ref 0–0.04)
IMM GRANULOCYTES NFR BLD AUTO: 0 % (ref 0–0.5)
LYMPHOCYTES # BLD: 0.9 K/UL (ref 0.8–3.5)
LYMPHOCYTES NFR BLD: 8 % (ref 12–49)
MCH RBC QN AUTO: 17.7 PG (ref 26–34)
MCHC RBC AUTO-ENTMCNC: 26.4 G/DL (ref 30–36.5)
MCV RBC AUTO: 67 FL (ref 80–99)
MONOCYTES # BLD: 0.9 K/UL (ref 0–1)
MONOCYTES NFR BLD: 8 % (ref 5–13)
NEUTS SEG # BLD: 9.7 K/UL (ref 1.8–8)
NEUTS SEG NFR BLD: 84 % (ref 32–75)
NRBC # BLD: 0 K/UL (ref 0–0.01)
NRBC BLD-RTO: 0 PER 100 WBC
PLATELET # BLD AUTO: 453 K/UL (ref 150–400)
PMV BLD AUTO: 10 FL (ref 8.9–12.9)
POTASSIUM SERPL-SCNC: 4.1 MMOL/L (ref 3.5–5.1)
RBC # BLD AUTO: 4.52 M/UL (ref 4.1–5.7)
RBC MORPH BLD: ABNORMAL
SODIUM SERPL-SCNC: 138 MMOL/L (ref 136–145)
WBC # BLD AUTO: 11.5 K/UL (ref 4.1–11.1)

## 2024-11-06 PROCEDURE — 80048 BASIC METABOLIC PNL TOTAL CA: CPT

## 2024-11-06 PROCEDURE — 36415 COLL VENOUS BLD VENIPUNCTURE: CPT

## 2024-11-06 PROCEDURE — 6360000002 HC RX W HCPCS: Performed by: SURGERY

## 2024-11-06 PROCEDURE — 6370000000 HC RX 637 (ALT 250 FOR IP): Performed by: SURGERY

## 2024-11-06 PROCEDURE — 85025 COMPLETE CBC W/AUTO DIFF WBC: CPT

## 2024-11-06 RX ORDER — OXYCODONE HYDROCHLORIDE 5 MG/1
5 TABLET ORAL EVERY 6 HOURS PRN
Qty: 10 TABLET | Refills: 0 | Status: SHIPPED | OUTPATIENT
Start: 2024-11-06 | End: 2024-11-09

## 2024-11-06 RX ADMIN — KETOROLAC TROMETHAMINE 30 MG: 30 INJECTION, SOLUTION INTRAMUSCULAR at 10:39

## 2024-11-06 RX ADMIN — ALVIMOPAN 12 MG: 12 CAPSULE ORAL at 08:54

## 2024-11-06 RX ADMIN — FERROUS SULFATE TAB 325 MG (65 MG ELEMENTAL FE) 325 MG: 325 (65 FE) TAB at 08:54

## 2024-11-06 RX ADMIN — KETOROLAC TROMETHAMINE 30 MG: 30 INJECTION, SOLUTION INTRAMUSCULAR at 04:49

## 2024-11-06 RX ADMIN — HYDROCHLOROTHIAZIDE 25 MG: 12.5 CAPSULE ORAL at 08:54

## 2024-11-06 RX ADMIN — ENOXAPARIN SODIUM 30 MG: 100 INJECTION SUBCUTANEOUS at 08:54

## 2024-11-06 ASSESSMENT — PAIN DESCRIPTION - DESCRIPTORS: DESCRIPTORS: DISCOMFORT

## 2024-11-06 ASSESSMENT — PAIN - FUNCTIONAL ASSESSMENT: PAIN_FUNCTIONAL_ASSESSMENT: ACTIVITIES ARE NOT PREVENTED

## 2024-11-06 ASSESSMENT — PAIN SCALES - GENERAL
PAINLEVEL_OUTOF10: 0
PAINLEVEL_OUTOF10: 3
PAINLEVEL_OUTOF10: 1

## 2024-11-06 ASSESSMENT — PAIN DESCRIPTION - LOCATION
LOCATION: ABDOMEN
LOCATION: ABDOMEN

## 2024-11-06 NOTE — PROGRESS NOTES
Spiritual Health History and Assessment/Progress Note  [facility]    Follow-up,  ,  ,      Name: Felipe Nix MRN: [id number]    Age: 59 y.o.     Sex: male   Language: English   Amish: Voodoo   Colon cancer (HCC)     Date: 11/6/2024            Total Time Calculated: 10 min              Spiritual Assessment continued in [facility] 3 SURG TELE        Referral/Consult From: Rounding   Encounter Overview/Reason: Follow-up  Service Provided For: Patient    Adina, Belief, Meaning:   Patient identifies as spiritual, is connected with a adina tradition or spiritual practice, and has beliefs or practices that help with coping during difficult times  Family/Friends No family/friends present      Importance and Influence:  Patient has spiritual/personal beliefs that influence decisions regarding their health  Family/Friends No family/friends present    Community:  Patient feels well-supported. Support system includes: Friends  Family/Friends No family/friends present    Assessment and Plan of Care:     Patient Interventions include: Facilitated expression of thoughts and feelings and Affirmed coping skills/support systems  Family/Friends Interventions include: No family/friends present    Patient Plan of Care: No spiritual needs identified for follow-up  Family/Friends Plan of Care: No spiritual needs identified for follow-up    Electronically signed by Chaplain Evon on 11/6/2024 at 1:16 PM

## 2024-11-06 NOTE — DISCHARGE INSTRUCTIONS
Milana Dorantes MD, FACS  Edi Benites MD, FACS  MD Umesh Adams MD J.J. Coury, MD Kathryn Chuquin, MD      Discharge Instructions for [facility] Colorectal Surgery Patients       Do not lift any objects weighing more than 10 pounds for 4 weeks.    Do not do any housework including vacuuming, scrubbing or yardwork for 4 weeks.    Do not drive for two weeks or while taking sedating medications.    You may walk as desired and go up and down stairs as needed.    You may shower.  Do not take tub baths, swim or use hot tubs for 4 weeks.    Leave steri-strips on incision. They will fall off on their own.   You may have dermabond on your incisions which is surgical glue.   This will dissolve over time.  Do not scrub around incisions.    Follow low-fiber diet for 2 weeks. (See handbook for additional details).     Drink nutritional supplements 2 times per day until your diet and appetite are back to normal. Diabetic patients should drink one-half bottle 4 times daily.     Multiple bowel movements are normal each day.   Contact your surgeon’s office for any concerns.    Take Acetaminophen 1000mg every 6 hours for 24 hours, then as needed for pain, Ibuprofen 200-400 mg every 8 hours as needed for pain, and Oxycodone (per prescription instructions)    Follow up with providers as scheduled.    If your surgery involves an ostomy bag, please bring your supplies to the first office visit with your surgeon.     If you experience fever (greater than 100.5), chills, vomiting or redness or drainage at surgical site, please contact your surgeon’s office.    For questions regarding quality or quantity of ostomy output, refer to [facility] handbook or call surgeon’s office.    Please see handbook for additional instructions. If you have further questions, please call your surgeon’s office.

## 2024-11-06 NOTE — PROGRESS NOTES
Attempted to schedule NEW PATIENT PCP hospital follow up appointment with [facility]. Unable to reach anyone, left voicemail. [facility] information on AVS for patient resource. Pending patient discharge.

## 2024-11-06 NOTE — DISCHARGE SUMMARY
Post- Surgical Discharge Summary    Patient ID:  Felipe Nix  [id number]  male  59 y.o.  1965    Admit date: 11/5/2024    Discharge date: 11/06/24     Admitting Physician: Torrey Henderson II, MD     Date of Surgery:   11/5/2024     Preoperative Diagnosis:  Malignant neoplasm of colon, unspecified part of colon (HCC) [C18.9]    Postoperative Diagnosis:   * No post-op diagnosis entered *    Procedure(s):  ROBOTIC RIGHT COLECTOMY (E R A S)     Anesthesia Type:   General     Surgeon: Torrey Henderson II, MD                            HPI:  Pt is a 59 y.o. male who has a history of Malignant neoplasm of colon, unspecified part of colon (HCC) [C18.9] who presents at this time for a Right colectomy.    Problem List:   Patient Active Problem List   Diagnosis    Diverticulitis    Colon cancer (HCC)        Hospital Course:  The patient underwent surgery. Intra-operative complications: None; patient tolerated the procedure well. Was taken to the PACU in stable condition and then transferred to the surgical floor.      Pathology is pending. Surgeon will follow results as outpatient.    Perioperative Antibiotics: Cefazolin and Metronidazole    Postoperative Pain Management:  Oxycodone     Postoperative transfusions:    Number of units banked PRBCs =   none     Post Op complications: None     Incisions  - clean, dry and intact. No significant erythema or swelling. Wound(s) appear to be healing without any evidence of infection.      Patient mobilized with nursing and was found to be safe and steady with ambulation.     Discharged to: Home     Condition on Discharge: Stable     Discharge instructions:    - Take pain medications as prescribed  - Diet Low Fiber  - Discharge activity:    - Activity as tolerated    - Ambulate several times a day   - No heavy lifting for 4 weeks   - Do not drive for two weeks or while on opioid pain medications  - Wound Care: Keep wound(s) clean and dry. See discharge instruction

## 2024-11-06 NOTE — PROGRESS NOTES
11/6/2024        RE: Felipe Nix         [address] [facility] [address]          To Whom It May Concern,      Due to medical reasons, Felipe Nix may  may return to work on 11/27/24 with no heavy lifting for an additional week.         Sincerely,          HERLINDA Cantu - NP

## 2024-11-06 NOTE — PROGRESS NOTES
End of Shift Note    Bedside shift change report given to Chava BLACK (oncoming nurse) by Rochelle Valadez RN (offgoing nurse).  Report included the following information SBAR, OR Summary, Intake/Output, MAR, and Recent Results    Shift worked:  1900-0730     Shift summary and any significant changes:     Patient resting quietly this shift, up ambulating in beck[place] x 1- tolerated well; grove catheter d/c per orders, draining 1250 clear raul urine, patient informed of need to independently void by 1200/bladder scan/straight cath if unable to void- verbalized agreement w/ plan; patient up to BR x 2 for soft/loose brown stool per rectum with gas, ambulating independently; patient tolerating diet w/o complications; pain well managed w/ scheduled Toradol, no PRN required.   Concerns for physician to address:  D/C planning; IVF on hold for re-evaluation  As patient is tolerating PO (>1000 cc/shift)   Zone phone for oncoming shift:   1800       Activity:     Number times ambulated in hallways past shift: 1  Number of times OOB to chair past shift: 3    Cardiac:   Cardiac Monitoring: No           Access:  Current line(s): PIV x 2    Genitourinary:   Urinary status: due to void    Respiratory:      Chronic home O2 use?: NO  Incentive spirometer at bedside: YES       GI:     Current diet:  ADULT DIET; Regular; Low Fiber  Passing flatus: YES  Tolerating current diet: YES       Pain Management:   Patient states pain is manageable on current regimen: YES    Skin:     Interventions: increase time out of bed and nutritional support    Patient Safety:  Fall Score:    Interventions: bed/chair alarm, gripper socks, pt to call before getting OOB, and stay with me (per policy)       Length of Stay:  Expected LOS: 3  Actual LOS: 1      Rochelle Valadez, RN

## 2024-11-06 NOTE — PROGRESS NOTES
1221- pt is alert and oriented with no needs at this time. Pt has been given all discharge information with no questions at this time. Pt ride will be here by 2 pm.

## 2024-11-07 NOTE — PROGRESS NOTES
11/7/24 0906 POST DISCHARGE NOTE: NEW PATIENT PCP hospital follow-up transitional care appointment has been scheduled with Dr. Liana Cruz on 12/9/24 1400. CM spoke with patient by phone to give appt date/time/location of appt. Patient verbalized understanding of appt time/date/location. [facility] information on AVS for patient resource.   Pending patient discharge.

## 2024-11-11 ENCOUNTER — HOSPITAL ENCOUNTER (EMERGENCY)
Facility: HOSPITAL | Age: 59
Discharge: HOME | End: 2024-11-11
Attending: STUDENT IN AN ORGANIZED HEALTH CARE EDUCATION/TRAINING PROGRAM
Payer: MEDICAID

## 2024-11-11 VITALS
BODY MASS INDEX: 33.89 KG/M2 | WEIGHT: 250.22 LBS | SYSTOLIC BLOOD PRESSURE: 154 MMHG | HEART RATE: 77 BPM | RESPIRATION RATE: 20 BRPM | HEIGHT: 72 IN | OXYGEN SATURATION: 95 % | DIASTOLIC BLOOD PRESSURE: 85 MMHG | TEMPERATURE: 98.1 F

## 2024-11-11 DIAGNOSIS — R58 BLEEDING: Primary | ICD-10-CM

## 2024-11-11 LAB
ANION GAP SERPL CALC-SCNC: 5 MMOL/L (ref 2–12)
BASOPHILS # BLD: 0 K/UL (ref 0–0.1)
BASOPHILS NFR BLD: 0 % (ref 0–1)
BUN SERPL-MCNC: 12 MG/DL (ref 6–20)
BUN/CREAT SERPL: 14 (ref 12–20)
CALCIUM SERPL-MCNC: 9.1 MG/DL (ref 8.5–10.1)
CHLORIDE SERPL-SCNC: 105 MMOL/L (ref 97–108)
CO2 SERPL-SCNC: 27 MMOL/L (ref 21–32)
CREAT SERPL-MCNC: 0.85 MG/DL (ref 0.7–1.3)
DIFFERENTIAL METHOD BLD: ABNORMAL
EOSINOPHIL # BLD: 0.2 K/UL (ref 0–0.4)
EOSINOPHIL NFR BLD: 2 % (ref 0–7)
ERYTHROCYTE [DISTWIDTH] IN BLOOD BY AUTOMATED COUNT: 30.7 % (ref 11.5–14.5)
GLUCOSE SERPL-MCNC: 101 MG/DL (ref 65–100)
HCT VFR BLD AUTO: 31.7 % (ref 36.6–50.3)
HGB BLD-MCNC: 8.3 G/DL (ref 12.1–17)
IMM GRANULOCYTES # BLD AUTO: 0 K/UL (ref 0–0.04)
IMM GRANULOCYTES NFR BLD AUTO: 0 % (ref 0–0.5)
LYMPHOCYTES # BLD: 1.6 K/UL (ref 0.8–3.5)
LYMPHOCYTES NFR BLD: 20 % (ref 12–49)
MCH RBC QN AUTO: 17.9 PG (ref 26–34)
MCHC RBC AUTO-ENTMCNC: 26.2 G/DL (ref 30–36.5)
MCV RBC AUTO: 68.5 FL (ref 80–99)
MONOCYTES # BLD: 0.6 K/UL (ref 0–1)
MONOCYTES NFR BLD: 7 % (ref 5–13)
NEUTS SEG # BLD: 5.7 K/UL (ref 1.8–8)
NEUTS SEG NFR BLD: 71 % (ref 32–75)
NRBC # BLD: 0 K/UL (ref 0–0.01)
NRBC BLD-RTO: 0 PER 100 WBC
PLATELET # BLD AUTO: 548 K/UL (ref 150–400)
PMV BLD AUTO: 9.4 FL (ref 8.9–12.9)
POTASSIUM SERPL-SCNC: 3.9 MMOL/L (ref 3.5–5.1)
RBC # BLD AUTO: 4.63 M/UL (ref 4.1–5.7)
RBC MORPH BLD: ABNORMAL
SODIUM SERPL-SCNC: 137 MMOL/L (ref 136–145)
WBC # BLD AUTO: 8.1 K/UL (ref 4.1–11.1)

## 2024-11-11 PROCEDURE — 36415 COLL VENOUS BLD VENIPUNCTURE: CPT

## 2024-11-11 PROCEDURE — 80048 BASIC METABOLIC PNL TOTAL CA: CPT

## 2024-11-11 PROCEDURE — 99283 EMERGENCY DEPT VISIT LOW MDM: CPT

## 2024-11-11 PROCEDURE — 85025 COMPLETE CBC W/AUTO DIFF WBC: CPT

## 2024-11-11 ASSESSMENT — PAIN DESCRIPTION - DESCRIPTORS: DESCRIPTORS: OTHER (COMMENT);SORE

## 2024-11-11 ASSESSMENT — PAIN DESCRIPTION - LOCATION: LOCATION: ABDOMEN

## 2024-11-11 ASSESSMENT — PAIN DESCRIPTION - PAIN TYPE: TYPE: SURGICAL PAIN

## 2024-11-11 ASSESSMENT — PAIN DESCRIPTION - ONSET: ONSET: ON-GOING

## 2024-11-11 ASSESSMENT — PAIN - FUNCTIONAL ASSESSMENT
PAIN_FUNCTIONAL_ASSESSMENT: 0-10
PAIN_FUNCTIONAL_ASSESSMENT: PREVENTS OR INTERFERES SOME ACTIVE ACTIVITIES AND ADLS

## 2024-11-11 ASSESSMENT — PAIN DESCRIPTION - ORIENTATION: ORIENTATION: MID

## 2024-11-11 ASSESSMENT — PAIN DESCRIPTION - FREQUENCY: FREQUENCY: CONTINUOUS

## 2024-11-11 ASSESSMENT — PAIN SCALES - GENERAL: PAINLEVEL_OUTOF10: 8

## 2024-11-11 NOTE — ED PROVIDER NOTES
Mood and Affect: Mood normal.         Behavior: Behavior normal.             SCREENINGS                Clinical Opiate Withdrawal Scale Score: 1 (10/23/2024  8:16 AM)      LAB, EKG AND DIAGNOSTIC RESULTS   Labs:  No results found for this or any previous visit (from the past 12 hour(s)).    EKG:.Not Applicable    Radiologic Studies:  Non-plain film images such as CT, Ultrasound and MRI are read by the radiologist. Plain radiographic images are visualized and preliminarily interpreted by the ED Provider with the following findings: Not Applicable.    Interpretation per the Radiologist below, if available at the time of this note:  No orders to display        ED COURSE and DIFFERENTIAL DIAGNOSIS/MDM   7:41 AM Differential and Considerations: Patient presents with concern for bleeding from the wound.  Only minimal bleeding noted on physical exam.  Dressing packed with quick clot and bandaging.  Hemoglobin checked to evaluate for acute blood loss.  Hemoglobin actually improved from previous blood draw last week.  Acute anemia ruled out, only chronic anemia present with hemoglobin 8.3 up from 8.0, no significant leukocytosis, no significant basic metabolic abnormalities    Lab work interpretation by ER physician as above    Discussed immediate follow-up with patient surgeon and return with any worsening bleeding lightheadedness dizziness or symptoms.  Patient currently asymptomatic and will monitor symptoms at home.  No indication for admission or emergent radiology studies at this time    Records Reviewed (source and summary of external notes): Prior medical records and Nursing notes.    Vitals:    Vitals:    11/11/24 0530 11/11/24 0545 11/11/24 0600 11/11/24 0615   BP: (!) 148/86 (!) 151/92  (!) 154/85   Pulse: 75 80 78 77   Resp:       Temp:       TempSrc:       SpO2: 99% 98% 97% 95%   Weight:       Height:            ED COURSE           Patient was given the following medications:  Medications - No data to  MD  11/17/24 0743

## 2024-11-11 NOTE — DISCHARGE INSTRUCTIONS
Your blood counts were stable compared to values taken during the surgery and your last hospital admission.  So there has been no severe bleeding.  Please keep the bandage on that we have placed in the hospital and see your surgeon within the next 24 hours for reevaluation.  Please call them immediately to schedule an appointment and return to the ER with any severe bleeding that does not stop, lightheadedness, dizziness, passing out.        Thank you for choosing our Emergency Department for your care.  It is our privilege to care for you in your time of need.  In the next several days, you may receive a survey via email or mailed to your home about your experience with our team.  We would greatly appreciate you taking a few minutes to complete the survey, as we use this information to learn what we have done well and what we could be doing better. Thank you for trusting us with your care!    Below you will find a list of your tests from today's visit.   Labs  Recent Results (from the past 12 hour(s))   BMP    Collection Time: 11/11/24  5:53 AM   Result Value Ref Range    Sodium 137 136 - 145 mmol/L    Potassium 3.9 3.5 - 5.1 mmol/L    Chloride 105 97 - 108 mmol/L    CO2 27 21 - 32 mmol/L    Anion Gap 5 2 - 12 mmol/L    Glucose 101 (H) 65 - 100 mg/dL    BUN 12 6 - 20 MG/DL    Creatinine 0.85 0.70 - 1.30 MG/DL    BUN/Creatinine Ratio 14 12 - 20      Est, Glom Filt Rate >90 >60 ml/min/1.73m2    Calcium 9.1 8.5 - 10.1 MG/DL   CBC with Auto Differential    Collection Time: 11/11/24  5:53 AM   Result Value Ref Range    WBC 8.1 4.1 - 11.1 K/uL    RBC 4.63 4.10 - 5.70 M/uL    Hemoglobin 8.3 (L) 12.1 - 17.0 g/dL    Hematocrit 31.7 (L) 36.6 - 50.3 %    MCV 68.5 (L) 80.0 - 99.0 FL    MCH 17.9 (L) 26.0 - 34.0 PG    MCHC 26.2 (L) 30.0 - 36.5 g/dL    RDW 30.7 (H) 11.5 - 14.5 %    Platelets 548 (H) 150 - 400 K/uL    MPV 9.4 8.9 - 12.9 FL    Nucleated RBCs 0.0 0  WBC    nRBC 0.00 0.00 - 0.01 K/uL    Neutrophils % PENDING %

## 2024-12-11 ENCOUNTER — HOSPITAL ENCOUNTER (OUTPATIENT)
Facility: HOSPITAL | Age: 59
Discharge: HOME | End: 2024-12-14
Attending: INTERNAL MEDICINE
Payer: COMMERCIAL

## 2024-12-11 VITALS
SYSTOLIC BLOOD PRESSURE: 156 MMHG | RESPIRATION RATE: 19 BRPM | OXYGEN SATURATION: 99 % | DIASTOLIC BLOOD PRESSURE: 67 MMHG | HEART RATE: 76 BPM | TEMPERATURE: 98.4 F

## 2024-12-11 DIAGNOSIS — C18.0 MALIGNANT NEOPLASM OF CECUM (HCC): ICD-10-CM

## 2024-12-11 PROBLEM — D50.9 IRON DEFICIENCY ANEMIA, UNSPECIFIED: Status: ACTIVE | Noted: 2024-12-11

## 2024-12-11 PROBLEM — D64.9 ANEMIA, UNSPECIFIED: Status: ACTIVE | Noted: 2024-12-11

## 2024-12-11 PROCEDURE — 6360000002 HC RX W HCPCS: Performed by: STUDENT IN AN ORGANIZED HEALTH CARE EDUCATION/TRAINING PROGRAM

## 2024-12-11 PROCEDURE — 77001 FLUOROGUIDE FOR VEIN DEVICE: CPT

## 2024-12-11 PROCEDURE — 6360000002 HC RX W HCPCS: Performed by: ANESTHESIOLOGY

## 2024-12-11 PROCEDURE — 2580000003 HC RX 258: Performed by: STUDENT IN AN ORGANIZED HEALTH CARE EDUCATION/TRAINING PROGRAM

## 2024-12-11 RX ORDER — MIDAZOLAM HYDROCHLORIDE 5 MG/5ML
INJECTION, SOLUTION INTRAMUSCULAR; INTRAVENOUS PRN
Status: COMPLETED | OUTPATIENT
Start: 2024-12-11 | End: 2024-12-11

## 2024-12-11 RX ORDER — SODIUM CHLORIDE 0.9 % (FLUSH) 0.9 %
5-40 SYRINGE (ML) INJECTION PRN
Status: CANCELLED | OUTPATIENT
Start: 2024-12-23

## 2024-12-11 RX ORDER — SODIUM CHLORIDE 9 MG/ML
5-250 INJECTION, SOLUTION INTRAVENOUS PRN
OUTPATIENT
Start: 2024-12-23

## 2024-12-11 RX ORDER — HYDROCORTISONE SODIUM SUCCINATE 100 MG/2ML
100 INJECTION INTRAMUSCULAR; INTRAVENOUS
OUTPATIENT
Start: 2024-12-23

## 2024-12-11 RX ORDER — ONDANSETRON 2 MG/ML
8 INJECTION INTRAMUSCULAR; INTRAVENOUS
OUTPATIENT
Start: 2024-12-23

## 2024-12-11 RX ORDER — FENTANYL CITRATE 50 UG/ML
INJECTION, SOLUTION INTRAMUSCULAR; INTRAVENOUS PRN
Status: COMPLETED | OUTPATIENT
Start: 2024-12-11 | End: 2024-12-11

## 2024-12-11 RX ORDER — ACETAMINOPHEN 325 MG/1
650 TABLET ORAL
OUTPATIENT
Start: 2024-12-23

## 2024-12-11 RX ORDER — HEPARIN 100 UNIT/ML
300 SYRINGE INTRAVENOUS ONCE
Status: COMPLETED | OUTPATIENT
Start: 2024-12-11 | End: 2024-12-11

## 2024-12-11 RX ORDER — EPINEPHRINE 1 MG/ML
0.3 INJECTION, SOLUTION INTRAMUSCULAR; SUBCUTANEOUS PRN
OUTPATIENT
Start: 2024-12-23

## 2024-12-11 RX ORDER — LIDOCAINE HCL/EPINEPHRINE/PF 2%-1:200K
20 VIAL (ML) INJECTION ONCE
Status: COMPLETED | OUTPATIENT
Start: 2024-12-11 | End: 2024-12-11

## 2024-12-11 RX ORDER — LIDOCAINE HYDROCHLORIDE 20 MG/ML
20 INJECTION, SOLUTION INFILTRATION; PERINEURAL ONCE
Status: COMPLETED | OUTPATIENT
Start: 2024-12-11 | End: 2024-12-11

## 2024-12-11 RX ORDER — DIPHENHYDRAMINE HYDROCHLORIDE 50 MG/ML
50 INJECTION INTRAMUSCULAR; INTRAVENOUS
OUTPATIENT
Start: 2024-12-23

## 2024-12-11 RX ORDER — MIDAZOLAM HYDROCHLORIDE 5 MG/ML
INJECTION, SOLUTION INTRAMUSCULAR; INTRAVENOUS PRN
Status: COMPLETED | OUTPATIENT
Start: 2024-12-11 | End: 2024-12-11

## 2024-12-11 RX ORDER — SODIUM CHLORIDE 9 MG/ML
INJECTION, SOLUTION INTRAVENOUS CONTINUOUS
OUTPATIENT
Start: 2024-12-23

## 2024-12-11 RX ORDER — ALBUTEROL SULFATE 90 UG/1
4 INHALANT RESPIRATORY (INHALATION) PRN
OUTPATIENT
Start: 2024-12-23

## 2024-12-11 RX ORDER — HEPARIN SODIUM 200 [USP'U]/100ML
200 INJECTION, SOLUTION INTRAVENOUS ONCE
Status: COMPLETED | OUTPATIENT
Start: 2024-12-11 | End: 2024-12-11

## 2024-12-11 RX ADMIN — LIDOCAINE HYDROCHLORIDE,EPINEPHRINE BITARTRATE 10 ML: 20; .005 INJECTION, SOLUTION EPIDURAL; INFILTRATION; INTRACAUDAL; PERINEURAL at 10:45

## 2024-12-11 RX ADMIN — MIDAZOLAM HYDROCHLORIDE 2 MG: 5 INJECTION, SOLUTION INTRAMUSCULAR; INTRAVENOUS at 10:18

## 2024-12-11 RX ADMIN — FENTANYL CITRATE 50 MCG: 50 INJECTION, SOLUTION INTRAMUSCULAR; INTRAVENOUS at 10:18

## 2024-12-11 RX ADMIN — MIDAZOLAM HYDROCHLORIDE 1 MG: 1 INJECTION, SOLUTION INTRAMUSCULAR; INTRAVENOUS at 10:29

## 2024-12-11 RX ADMIN — MIDAZOLAM HYDROCHLORIDE 1 MG: 1 INJECTION, SOLUTION INTRAMUSCULAR; INTRAVENOUS at 10:43

## 2024-12-11 RX ADMIN — FENTANYL CITRATE 50 MCG: 50 INJECTION, SOLUTION INTRAMUSCULAR; INTRAVENOUS at 10:29

## 2024-12-11 RX ADMIN — Medication 500 UNITS: at 10:45

## 2024-12-11 RX ADMIN — HEPARIN SODIUM IN SODIUM CHLORIDE 500 ML: 200 INJECTION INTRAVENOUS at 10:46

## 2024-12-11 RX ADMIN — LIDOCAINE HYDROCHLORIDE 10 ML: 20 INJECTION, SOLUTION INFILTRATION; PERINEURAL at 10:45

## 2024-12-11 RX ADMIN — WATER 2000 MG: 1 INJECTION INTRAMUSCULAR; INTRAVENOUS; SUBCUTANEOUS at 09:30

## 2024-12-11 ASSESSMENT — PAIN - FUNCTIONAL ASSESSMENT: PAIN_FUNCTIONAL_ASSESSMENT: NONE - DENIES PAIN

## 2024-12-11 ASSESSMENT — PAIN SCALES - GENERAL
PAINLEVEL_OUTOF10: 0
PAINLEVEL_OUTOF10: 0

## 2024-12-11 NOTE — H&P
Radiology History and Physical    Patient: Felipe Nix 59 y.o. male       Chief Complaint: Colorectal cancer     History of Present Illness: Mr. Nix is a 59 y.o. man with colorectal cancer who presents for port placement for adjuvant chemotherapy.     History:    Past Medical History:   Diagnosis Date    At risk for sleep apnea 11/04/2024    PRECIOUS 5     Family History   Problem Relation Age of Onset    Diabetes Mother     Hypertension Mother     Diabetes Father     Hypertension Father     Cancer Sister         colon    Cancer Brother      Social History     Socioeconomic History    Marital status: Single     Spouse name: Not on file    Number of children: Not on file    Years of education: Not on file    Highest education level: Not on file   Occupational History    Not on file   Tobacco Use    Smoking status: Never    Smokeless tobacco: Never   Vaping Use    Vaping status: Never Used   Substance and Sexual Activity    Alcohol use: Not Currently    Drug use: Never    Sexual activity: Not on file   Other Topics Concern    Not on file   Social History Narrative    Not on file     Social Determinants of Health     Financial Resource Strain: Not on file   Food Insecurity: No Food Insecurity (11/5/2024)    Hunger Vital Sign     Worried About Running Out of Food in the Last Year: Never true     Ran Out of Food in the Last Year: Never true   Transportation Needs: No Transportation Needs (11/5/2024)    PRAPARE - Transportation     Lack of Transportation (Medical): No     Lack of Transportation (Non-Medical): No   Physical Activity: Not on file   Stress: Not on file   Social Connections: Not on file   Intimate Partner Violence: Not on file   Housing Stability: Low Risk  (11/5/2024)    Housing Stability Vital Sign     Unable to Pay for Housing in the Last Year: No     Number of Times Moved in the Last Year: 1     Homeless in the Last Year: No       Allergies: No Known Allergies    Current Medications:  Current Outpatient

## 2024-12-11 NOTE — DISCHARGE INSTRUCTIONS
[facility]  Department of Interventional Radiology  [address] [place] [address] [phone]    PORT-A-CATH DISCHARGE EDUCATION      Radiologist:         Date:         Information:   A Port-a-cath (Port) is an implanted device giving access to veins for patients who need regular long-term administration of antibiotics or chemotherapy drugs. Chemotherapy drugs can damage small veins and often need to be delivered into a large central vein where the drugs are instantly diluted by the blood stream and distributed efficiently to the entire body. For patients with difficult veins, ports can also be used for withdrawing blood for blood tests.     What should I expect after the port placement?    The area around the port may be swollen and tender following insertion.    You may have some mild pain or muscle soreness. This is normal. You can take acetaminophen (Tylenol®) or other non-aspirin pain medicine if you need to.    If left unused the port needs to be flushed.   Once the wound is healed no dressing will be required.    Once the port is no longer required, it will be removed. The removal process will be similar to insertion, performed under local anesthetic and sedation.     Bathing & Wound Care:   The portacath system requires no daily care from you. Your incision has been sealed with skin glue which will come off once the site is healed.   You may shower after the port-a-cath has been inserted. However, keep the incision site out of direct contact with water stream.   No baths, hot tubs, rivers or lakes submersions of the access site to decrease the risk of infection.    Once the wound is healed no dressing will be required.    Apply a warm compress to affected area for comfort as needed.    Protect the skin over your port. Don’t wear tight bras, suspenders, or carry a purse or bag across your chest.    It is recommended that you do not perform any strenuous

## 2024-12-11 NOTE — PROGRESS NOTES
Procedure reviewed with patient by Dr. Paige Rebolledo.  Opportunity to verbalize questions and concerns.  Consent obtained.

## 2024-12-11 NOTE — PROGRESS NOTES
Name of procedure: Port placement    Sedation medications given:    Versed: 4 mg iv    Fentanyl: 100 mcg iv    Reversal Agent Used: no    Sedation tolerated: well    Sedation start:  1017    Sedation end:  1040    Vital Signs:stable    Any complications related to procedure: none

## 2024-12-11 NOTE — PROCEDURES
Brief Postoperative Note    Felipe Nix  YOB: 1965  [id number]    Pre-operative Diagnosis: Colon cancer     Post-operative Diagnosis: Same    Procedure: Right port placement     Anesthesia: Local and Moderate Sedation    Surgeons/Assistants: Paige Rebolledo MD    Estimated Blood Loss: Minimal     Complications: None    Specimens: Was Not Obtained    Findings: Right port placement.     Electronically signed by Paige Rebolledo MD on 12/11/2024 at 10:53 AM

## 2024-12-12 ENCOUNTER — OFFICE VISIT (OUTPATIENT)
Facility: CLINIC | Age: 59
End: 2024-12-12
Payer: COMMERCIAL

## 2024-12-12 VITALS
OXYGEN SATURATION: 98 % | HEIGHT: 72 IN | SYSTOLIC BLOOD PRESSURE: 127 MMHG | TEMPERATURE: 98 F | DIASTOLIC BLOOD PRESSURE: 80 MMHG | RESPIRATION RATE: 14 BRPM | BODY MASS INDEX: 34.95 KG/M2 | HEART RATE: 81 BPM | WEIGHT: 258 LBS

## 2024-12-12 DIAGNOSIS — G89.29 CHRONIC PAIN OF BOTH KNEES: ICD-10-CM

## 2024-12-12 DIAGNOSIS — M25.562 CHRONIC PAIN OF BOTH KNEES: ICD-10-CM

## 2024-12-12 DIAGNOSIS — M25.561 CHRONIC PAIN OF BOTH KNEES: ICD-10-CM

## 2024-12-12 DIAGNOSIS — Z12.5 PROSTATE CANCER SCREENING: ICD-10-CM

## 2024-12-12 DIAGNOSIS — N50.89 ENLARGEMENT OF SCROTAL SAC: ICD-10-CM

## 2024-12-12 DIAGNOSIS — E66.9 OBESITY (BMI 30-39.9): ICD-10-CM

## 2024-12-12 LAB
ALBUMIN SERPL-MCNC: 3.4 G/DL (ref 3.5–5)
ALBUMIN/GLOB SERPL: 0.9 (ref 1.1–2.2)
ALP SERPL-CCNC: 102 U/L (ref 45–117)
ALT SERPL-CCNC: 33 U/L (ref 12–78)
ANION GAP SERPL CALC-SCNC: 6 MMOL/L (ref 2–12)
AST SERPL-CCNC: 25 U/L (ref 15–37)
BASOPHILS # BLD: 0 K/UL (ref 0–0.1)
BASOPHILS NFR BLD: 1 % (ref 0–1)
BILIRUB SERPL-MCNC: 0.3 MG/DL (ref 0.2–1)
BUN SERPL-MCNC: 8 MG/DL (ref 6–20)
BUN/CREAT SERPL: 8 (ref 12–20)
CALCIUM SERPL-MCNC: 8.8 MG/DL (ref 8.5–10.1)
CHLORIDE SERPL-SCNC: 108 MMOL/L (ref 97–108)
CHOLEST SERPL-MCNC: 126 MG/DL
CO2 SERPL-SCNC: 28 MMOL/L (ref 21–32)
CREAT SERPL-MCNC: 0.98 MG/DL (ref 0.7–1.3)
CREAT UR-MCNC: 280 MG/DL
DIFFERENTIAL METHOD BLD: ABNORMAL
EOSINOPHIL # BLD: 0 K/UL (ref 0–0.4)
EOSINOPHIL NFR BLD: 1 % (ref 0–7)
ERYTHROCYTE [DISTWIDTH] IN BLOOD BY AUTOMATED COUNT: 27.1 % (ref 11.5–14.5)
EST. AVERAGE GLUCOSE BLD GHB EST-MCNC: 111 MG/DL
GLOBULIN SER CALC-MCNC: 4 G/DL (ref 2–4)
GLUCOSE SERPL-MCNC: 146 MG/DL (ref 65–100)
HBA1C MFR BLD: 5.5 % (ref 4–5.6)
HCT VFR BLD AUTO: 37.5 % (ref 36.6–50.3)
HDLC SERPL-MCNC: 43 MG/DL
HDLC SERPL: 2.9 (ref 0–5)
HGB BLD-MCNC: 10.1 G/DL (ref 12.1–17)
IMM GRANULOCYTES # BLD AUTO: 0 K/UL (ref 0–0.04)
IMM GRANULOCYTES NFR BLD AUTO: 0 % (ref 0–0.5)
LDLC SERPL CALC-MCNC: 64.2 MG/DL (ref 0–100)
LYMPHOCYTES # BLD: 1.2 K/UL (ref 0.8–3.5)
LYMPHOCYTES NFR BLD: 29 % (ref 12–49)
MCH RBC QN AUTO: 20 PG (ref 26–34)
MCHC RBC AUTO-ENTMCNC: 26.9 G/DL (ref 30–36.5)
MCV RBC AUTO: 74.3 FL (ref 80–99)
MICROALBUMIN UR-MCNC: 3.56 MG/DL
MICROALBUMIN/CREAT UR-RTO: 13 MG/G (ref 0–30)
MONOCYTES # BLD: 0.3 K/UL (ref 0–1)
MONOCYTES NFR BLD: 8 % (ref 5–13)
NEUTS SEG # BLD: 2.6 K/UL (ref 1.8–8)
NEUTS SEG NFR BLD: 61 % (ref 32–75)
NRBC # BLD: 0 K/UL (ref 0–0.01)
NRBC BLD-RTO: 0 PER 100 WBC
PLATELET # BLD AUTO: 307 K/UL (ref 150–400)
POTASSIUM SERPL-SCNC: 4.1 MMOL/L (ref 3.5–5.1)
PROT SERPL-MCNC: 7.4 G/DL (ref 6.4–8.2)
PSA SERPL-MCNC: 0.3 NG/ML (ref 0.01–4)
RBC # BLD AUTO: 5.05 M/UL (ref 4.1–5.7)
RBC MORPH BLD: ABNORMAL
SODIUM SERPL-SCNC: 142 MMOL/L (ref 136–145)
TRIGL SERPL-MCNC: 94 MG/DL
VLDLC SERPL CALC-MCNC: 18.8 MG/DL
WBC # BLD AUTO: 4.1 K/UL (ref 4.1–11.1)

## 2024-12-12 PROCEDURE — 99204 OFFICE O/P NEW MOD 45 MIN: CPT | Performed by: STUDENT IN AN ORGANIZED HEALTH CARE EDUCATION/TRAINING PROGRAM

## 2024-12-12 ASSESSMENT — PATIENT HEALTH QUESTIONNAIRE - PHQ9
SUM OF ALL RESPONSES TO PHQ QUESTIONS 1-9: 0
SUM OF ALL RESPONSES TO PHQ9 QUESTIONS 1 & 2: 0
SUM OF ALL RESPONSES TO PHQ QUESTIONS 1-9: 0
1. LITTLE INTEREST OR PLEASURE IN DOING THINGS: NOT AT ALL
2. FEELING DOWN, DEPRESSED OR HOPELESS: NOT AT ALL

## 2024-12-12 NOTE — ASSESSMENT & PLAN NOTE
BMI in office today 34.99. Provided counseling on lifestyle intervention - establishing healthier eating habits and regular exercise routine; Goal: 5% weight loss. Educational Material provided. Will revisit topic during next office visit.

## 2024-12-12 NOTE — PATIENT INSTRUCTIONS
You have been referred to: urology   **Please allow up to 2 weeks for their office to call you and schedule. If you have not heard from them beyond 2 weeks, contact their office directly to schedule an appointment.      Please call Central Scheduling [phone] to schedule your knee x-rays

## 2024-12-12 NOTE — ASSESSMENT & PLAN NOTE
Per patient, in the early 90s he underwent a scrotal procedure, he does not remember the name.  Today he endorses right scrotal enlargement with mild discomfort.  Physical exam findings did show that his right scrotum was larger than the left, no pain upon palpation.  Will refer to urology for further management.

## 2024-12-12 NOTE — ASSESSMENT & PLAN NOTE
Patient experiencing bilateral knee pain, onset 4 to 5 years ago, progressive over the past few months.  Will obtain x-ray for further investigation.  Will start voltaren gel. Given severity of symptoms, low threshold for PT and ortho referral.

## 2024-12-12 NOTE — PROGRESS NOTES
Felipe Nix a 59 y.o. male  has a past medical history of At risk for sleep apnea and Cancer (HCC). presents to office today to establish care.     Subjective:    Scrotal Pain  - notes that many years ago he states that his scrotum \"fell down\" and he underwent surgery to pull it back up (~90s)  - Onset: 9 months   - Location: right scrotum   - Duration: comes and goes   - Character: swelling that goes up and down, soft, mild pain   - erection and ejaculation not affected     Skin Color Change   - Notes that the bottom shaft area has become pink colored  - Unsure how or why it started   - Has not applied anything to area  - No itching associated  - No other symptoms, just the color change  - Has never happened in the past   - Denies inflammation     Obesity   - exercise: walking  - diet: notes that his diet is now better since being diagnosed with colon cancer    Knee Pain, bilateral   - constant, onset 4-5 years ago; unable to sit for a long amount of time, pain with standing, does not use cane or walker, takes tylenol, no radiation, left knee sometimes gets swollen, notes that his knee gives at times, mild crepitus    Colon Cancer  - has established care with oncology  - plans to start chemo 1/2/25    Health Maintenance   - Screening Labs (Hep C, HIV): due now   - Depression screening: mood stable   - Colon cancer screening: being treated for colon cancer   - Prostate cancer screening: never completed   - Lung cancer screening (low dose CT): n/a   - Smoking hx:   Tobacco Use      Smoking status: Never      Smokeless tobacco: Never        Health Maintenance   Topic Date Due    Depression Screen  Never done    HIV screen  Never done    Hepatitis C screen  Never done    Hepatitis B vaccine (1 of 3 - 19+ 3-dose series) Never done    DTaP/Tdap/Td vaccine (1 - Tdap) Never done    Lipids  Never done    Shingles vaccine (1 of 2) Never done    Flu vaccine (1) Never done    COVID-19 Vaccine (1 - 2023-24 season) Never

## 2024-12-18 ENCOUNTER — HOSPITAL ENCOUNTER (OUTPATIENT)
Facility: HOSPITAL | Age: 59
Setting detail: INFUSION SERIES
End: 2024-12-18

## 2024-12-18 DIAGNOSIS — C18.0 MALIGNANT NEOPLASM OF CECUM (HCC): ICD-10-CM

## 2024-12-18 DIAGNOSIS — D50.9 IRON DEFICIENCY ANEMIA, UNSPECIFIED IRON DEFICIENCY ANEMIA TYPE: Primary | ICD-10-CM

## 2024-12-23 ENCOUNTER — HOSPITAL ENCOUNTER (OUTPATIENT)
Facility: HOSPITAL | Age: 59
Setting detail: INFUSION SERIES
Discharge: HOME | End: 2024-12-23
Payer: COMMERCIAL

## 2024-12-23 VITALS
SYSTOLIC BLOOD PRESSURE: 166 MMHG | TEMPERATURE: 98.2 F | RESPIRATION RATE: 16 BRPM | OXYGEN SATURATION: 98 % | DIASTOLIC BLOOD PRESSURE: 76 MMHG | HEART RATE: 69 BPM

## 2024-12-23 DIAGNOSIS — D50.9 IRON DEFICIENCY ANEMIA, UNSPECIFIED IRON DEFICIENCY ANEMIA TYPE: Primary | ICD-10-CM

## 2024-12-23 DIAGNOSIS — C18.0 MALIGNANT NEOPLASM OF CECUM (HCC): ICD-10-CM

## 2024-12-23 PROCEDURE — 6360000002 HC RX W HCPCS: Performed by: INTERNAL MEDICINE

## 2024-12-23 PROCEDURE — 96365 THER/PROPH/DIAG IV INF INIT: CPT

## 2024-12-23 PROCEDURE — 2580000003 HC RX 258: Performed by: INTERNAL MEDICINE

## 2024-12-23 PROCEDURE — 2500000003 HC RX 250 WO HCPCS: Performed by: INTERNAL MEDICINE

## 2024-12-23 RX ORDER — SODIUM CHLORIDE 0.9 % (FLUSH) 0.9 %
5-40 SYRINGE (ML) INJECTION PRN
Status: DISCONTINUED | OUTPATIENT
Start: 2024-12-23 | End: 2024-12-24 | Stop reason: HOSPADM

## 2024-12-23 RX ORDER — ALBUTEROL SULFATE 90 UG/1
4 INHALANT RESPIRATORY (INHALATION) PRN
Status: CANCELLED | OUTPATIENT
Start: 2024-12-30

## 2024-12-23 RX ORDER — HYDROCORTISONE SODIUM SUCCINATE 100 MG/2ML
100 INJECTION INTRAMUSCULAR; INTRAVENOUS
Status: CANCELLED | OUTPATIENT
Start: 2024-12-30

## 2024-12-23 RX ORDER — SODIUM CHLORIDE 9 MG/ML
5-250 INJECTION, SOLUTION INTRAVENOUS PRN
Status: CANCELLED | OUTPATIENT
Start: 2024-12-30

## 2024-12-23 RX ORDER — DIPHENHYDRAMINE HYDROCHLORIDE 50 MG/ML
50 INJECTION INTRAMUSCULAR; INTRAVENOUS
Status: CANCELLED | OUTPATIENT
Start: 2024-12-30

## 2024-12-23 RX ORDER — ACETAMINOPHEN 325 MG/1
650 TABLET ORAL
Status: CANCELLED | OUTPATIENT
Start: 2024-12-30

## 2024-12-23 RX ORDER — ONDANSETRON 2 MG/ML
8 INJECTION INTRAMUSCULAR; INTRAVENOUS
Status: CANCELLED | OUTPATIENT
Start: 2024-12-30

## 2024-12-23 RX ORDER — EPINEPHRINE 1 MG/ML
0.3 INJECTION, SOLUTION INTRAMUSCULAR; SUBCUTANEOUS PRN
Status: CANCELLED | OUTPATIENT
Start: 2024-12-30

## 2024-12-23 RX ORDER — SODIUM CHLORIDE 9 MG/ML
INJECTION, SOLUTION INTRAVENOUS CONTINUOUS
Status: CANCELLED | OUTPATIENT
Start: 2024-12-30

## 2024-12-23 RX ORDER — SODIUM CHLORIDE 0.9 % (FLUSH) 0.9 %
5-40 SYRINGE (ML) INJECTION PRN
Status: CANCELLED | OUTPATIENT
Start: 2024-12-30

## 2024-12-23 RX ADMIN — SODIUM CHLORIDE, PRESERVATIVE FREE 10 ML: 5 INJECTION INTRAVENOUS at 15:01

## 2024-12-23 RX ADMIN — FERUMOXYTOL 510 MG: 510 INJECTION INTRAVENOUS at 15:02

## 2024-12-23 NOTE — PROGRESS NOTES
Name: Felipe Nix    MRN: [id number]         : 1965    Mr. Nix Arrived ambulatory and in no distress for Feraheme dose 1 of 2.  Assessment was completed, no acute issues at this time, no new complaints voiced. A 24 G PIV was established to right arm without difficulty.     Mr. Nix's vitals were reviewed.  Visit Vitals  BP (!) 159/76   Pulse 82   Temp 98.2 °F (36.8 °C) (Temporal)   Resp 18   SpO2 98%     Medications Administered         ferumoxytol (FERAHEME) 510 mg in sodium chloride 0.9 % 100 mL IVPB Admin Date  2024 Action  New Bag Dose  510 mg Rate  381 mL/hr Route  IntraVENous Documented By  Gauri Murrieta RN        sodium chloride flush 0.9 % injection 5-40 mL Admin Date  2024 Action  Given Dose  10 mL Rate   Route  IntraVENous Documented By  Gauri Murrieta RN             Mr. Nix remained in clinic for 30 minute observation period following treatment. Patient tolerated treatment well; no s/s of adverse reaction noted. PIV was flushed and removed per protocol.  [facility] and in stable condition. Discharge instructions reviewed with patient, verbalized understanding.  He is to return on 2024 for his next appointment.    Patient Vitals for the past 24 hrs:   BP Temp Temp src Pulse Resp SpO2   24 1550 (!) 166/76 -- -- 69 16 --   24 1430 (!) 159/76 98.2 °F (36.8 °C) Temporal 82 18 98 %        GAURI MURRIETA RN  2024

## 2024-12-30 ENCOUNTER — HOSPITAL ENCOUNTER (OUTPATIENT)
Facility: HOSPITAL | Age: 59
Setting detail: INFUSION SERIES
Discharge: HOME | End: 2024-12-30
Payer: COMMERCIAL

## 2024-12-30 VITALS
HEIGHT: 72 IN | TEMPERATURE: 98.4 F | RESPIRATION RATE: 16 BRPM | DIASTOLIC BLOOD PRESSURE: 88 MMHG | WEIGHT: 262 LBS | HEART RATE: 85 BPM | SYSTOLIC BLOOD PRESSURE: 165 MMHG | BODY MASS INDEX: 35.49 KG/M2 | OXYGEN SATURATION: 98 %

## 2024-12-30 DIAGNOSIS — C18.0 MALIGNANT NEOPLASM OF CECUM (HCC): ICD-10-CM

## 2024-12-30 DIAGNOSIS — D50.9 IRON DEFICIENCY ANEMIA, UNSPECIFIED IRON DEFICIENCY ANEMIA TYPE: Primary | ICD-10-CM

## 2024-12-30 PROCEDURE — 96365 THER/PROPH/DIAG IV INF INIT: CPT

## 2024-12-30 PROCEDURE — 6360000002 HC RX W HCPCS: Performed by: INTERNAL MEDICINE

## 2024-12-30 PROCEDURE — 2580000003 HC RX 258: Performed by: INTERNAL MEDICINE

## 2024-12-30 RX ORDER — SODIUM CHLORIDE 9 MG/ML
INJECTION, SOLUTION INTRAVENOUS CONTINUOUS
OUTPATIENT
Start: 2025-01-06

## 2024-12-30 RX ORDER — DIPHENHYDRAMINE HYDROCHLORIDE 50 MG/ML
50 INJECTION INTRAMUSCULAR; INTRAVENOUS
OUTPATIENT
Start: 2025-01-06

## 2024-12-30 RX ORDER — SODIUM CHLORIDE 9 MG/ML
5-250 INJECTION, SOLUTION INTRAVENOUS PRN
Status: DISCONTINUED | OUTPATIENT
Start: 2024-12-30 | End: 2024-12-31 | Stop reason: HOSPADM

## 2024-12-30 RX ORDER — SODIUM CHLORIDE 9 MG/ML
5-250 INJECTION, SOLUTION INTRAVENOUS PRN
OUTPATIENT
Start: 2025-01-06

## 2024-12-30 RX ORDER — EPINEPHRINE 1 MG/ML
0.3 INJECTION, SOLUTION INTRAMUSCULAR; SUBCUTANEOUS PRN
OUTPATIENT
Start: 2025-01-06

## 2024-12-30 RX ORDER — ACETAMINOPHEN 325 MG/1
650 TABLET ORAL
OUTPATIENT
Start: 2025-01-06

## 2024-12-30 RX ORDER — HYDROCORTISONE SODIUM SUCCINATE 100 MG/2ML
100 INJECTION INTRAMUSCULAR; INTRAVENOUS
OUTPATIENT
Start: 2025-01-06

## 2024-12-30 RX ORDER — ONDANSETRON 2 MG/ML
8 INJECTION INTRAMUSCULAR; INTRAVENOUS
OUTPATIENT
Start: 2025-01-06

## 2024-12-30 RX ORDER — SODIUM CHLORIDE 0.9 % (FLUSH) 0.9 %
5-40 SYRINGE (ML) INJECTION PRN
OUTPATIENT
Start: 2025-01-06

## 2024-12-30 RX ORDER — ALBUTEROL SULFATE 90 UG/1
4 INHALANT RESPIRATORY (INHALATION) PRN
OUTPATIENT
Start: 2025-01-06

## 2024-12-30 RX ORDER — DIPHENHYDRAMINE HYDROCHLORIDE 50 MG/ML
50 INJECTION INTRAMUSCULAR; INTRAVENOUS
Status: DISCONTINUED | OUTPATIENT
Start: 2024-12-30 | End: 2024-12-31 | Stop reason: HOSPADM

## 2024-12-30 RX ORDER — ACETAMINOPHEN 325 MG/1
650 TABLET ORAL
Status: DISCONTINUED | OUTPATIENT
Start: 2024-12-30 | End: 2024-12-31 | Stop reason: HOSPADM

## 2024-12-30 RX ADMIN — FERUMOXYTOL 510 MG: 510 INJECTION INTRAVENOUS at 14:46

## 2024-12-30 NOTE — PROGRESS NOTES
Name: Felipe Nix    MRN: [id number]         : 1965    Mr. Nix Arrived ambulatory and in no distress for Dose 2 for Feraheme. Assessment was completed, no acute issues at this time, patient did voice he broke out in a rash 2 days after Feraheme infusion last time, but he said he's not sure if it was from a soap he used or the Feraheme. Bia BLACK aware and looked at rash with me, it did not look like Hives and we educated patient on taking Benadryl if he feels any reaction coming on after he leaves the [facility]. A 24 G PIV established to right arm without difficulty.       Mr. Nix's vitals were reviewed.  Patient Vitals for the past 24 hrs:   BP Temp Temp src Pulse Resp SpO2 Height Weight   24 1512 (!) 165/88 -- -- 85 -- 98 % -- --   24 1411 (!) 159/80 98.4 °F (36.9 °C) Temporal 90 16 97 % 1.829 m (6') 118.8 kg (262 lb)         Lab results were obtained and reviewed.  No results found for this or any previous visit (from the past 12 hour(s)).    Medications:  Medications Administered         ferumoxytol (FERAHEME) 510 mg in sodium chloride 0.9 % 100 mL IVPB Admin Date  2024 Action  New Bag Dose  510 mg Rate  381 mL/hr Route  IntraVENous Documented By  Airam Mercedes, RN          +NS 0.9% --- x3 flushes     Mr. Nix tolerated treatment well and was [facility] in [facility] condition. PIV flushed & removed. Discharge instructions reviewed with patient, verbalized understanding. Patient politely declined to stay 30 minutes post infusion for monitoring. He is aware of his appointment.    Airam Mercedes RN  2024

## 2025-01-21 ENCOUNTER — HOSPITAL ENCOUNTER (OUTPATIENT)
Facility: HOSPITAL | Age: 60
Setting detail: INFUSION SERIES
Discharge: HOME | End: 2025-01-21
Payer: COMMERCIAL

## 2025-01-21 VITALS
HEART RATE: 87 BPM | TEMPERATURE: 98 F | SYSTOLIC BLOOD PRESSURE: 170 MMHG | HEIGHT: 72 IN | WEIGHT: 262.2 LBS | DIASTOLIC BLOOD PRESSURE: 87 MMHG | OXYGEN SATURATION: 94 % | RESPIRATION RATE: 16 BRPM | BODY MASS INDEX: 35.51 KG/M2

## 2025-01-21 DIAGNOSIS — C18.2 MALIGNANT NEOPLASM OF ASCENDING COLON (HCC): Primary | ICD-10-CM

## 2025-01-21 LAB
ALBUMIN SERPL-MCNC: 3.1 G/DL (ref 3.5–5)
ALBUMIN/GLOB SERPL: 0.7 (ref 1.1–2.2)
ALP SERPL-CCNC: 107 U/L (ref 45–117)
ALT SERPL-CCNC: 47 U/L (ref 12–78)
ANION GAP SERPL CALC-SCNC: 6 MMOL/L (ref 2–12)
AST SERPL-CCNC: 25 U/L (ref 15–37)
BASOPHILS # BLD: 0.03 K/UL (ref 0–0.1)
BASOPHILS NFR BLD: 0.6 % (ref 0–1)
BILIRUB SERPL-MCNC: 0.4 MG/DL (ref 0.2–1)
BUN SERPL-MCNC: 13 MG/DL (ref 6–20)
BUN/CREAT SERPL: 14 (ref 12–20)
CALCIUM SERPL-MCNC: 8.5 MG/DL (ref 8.5–10.1)
CHLORIDE SERPL-SCNC: 108 MMOL/L (ref 97–108)
CO2 SERPL-SCNC: 27 MMOL/L (ref 21–32)
CREAT SERPL-MCNC: 0.95 MG/DL (ref 0.7–1.3)
DIFFERENTIAL METHOD BLD: ABNORMAL
EOSINOPHIL # BLD: 0.04 K/UL (ref 0–0.4)
EOSINOPHIL NFR BLD: 0.8 % (ref 0–7)
ERYTHROCYTE [DISTWIDTH] IN BLOOD BY AUTOMATED COUNT: 23.8 % (ref 11.5–14.5)
GLOBULIN SER CALC-MCNC: 4.4 G/DL (ref 2–4)
GLUCOSE SERPL-MCNC: 192 MG/DL (ref 65–100)
HBV SURFACE AB SER QL: NONREACTIVE
HBV SURFACE AB SER-ACNC: <3.1 MIU/ML
HBV SURFACE AG SER QL: <0.1 INDEX
HBV SURFACE AG SER QL: NEGATIVE
HCT VFR BLD AUTO: 39.5 % (ref 36.6–50.3)
HGB BLD-MCNC: 11.7 G/DL (ref 12.1–17)
IMM GRANULOCYTES # BLD AUTO: 0.01 K/UL (ref 0–0.04)
IMM GRANULOCYTES NFR BLD AUTO: 0.2 % (ref 0–0.5)
LYMPHOCYTES # BLD: 1.56 K/UL (ref 0.8–3.5)
LYMPHOCYTES NFR BLD: 33.1 % (ref 12–49)
MCH RBC QN AUTO: 23.5 PG (ref 26–34)
MCHC RBC AUTO-ENTMCNC: 29.6 G/DL (ref 30–36.5)
MCV RBC AUTO: 79.3 FL (ref 80–99)
MONOCYTES # BLD: 0.33 K/UL (ref 0–1)
MONOCYTES NFR BLD: 7 % (ref 5–13)
NEUTS SEG # BLD: 2.74 K/UL (ref 1.8–8)
NEUTS SEG NFR BLD: 58.3 % (ref 32–75)
NRBC # BLD: 0 K/UL (ref 0–0.01)
NRBC BLD-RTO: 0 PER 100 WBC
PLATELET # BLD AUTO: 241 K/UL (ref 150–400)
PMV BLD AUTO: 9.6 FL (ref 8.9–12.9)
POTASSIUM SERPL-SCNC: 3.6 MMOL/L (ref 3.5–5.1)
PROT SERPL-MCNC: 7.5 G/DL (ref 6.4–8.2)
RBC # BLD AUTO: 4.98 M/UL (ref 4.1–5.7)
RBC MORPH BLD: ABNORMAL
RBC MORPH BLD: ABNORMAL
SODIUM SERPL-SCNC: 141 MMOL/L (ref 136–145)
WBC # BLD AUTO: 4.7 K/UL (ref 4.1–11.1)

## 2025-01-21 PROCEDURE — 87340 HEPATITIS B SURFACE AG IA: CPT

## 2025-01-21 PROCEDURE — 96413 CHEMO IV INFUSION 1 HR: CPT

## 2025-01-21 PROCEDURE — 96415 CHEMO IV INFUSION ADDL HR: CPT

## 2025-01-21 PROCEDURE — 36415 COLL VENOUS BLD VENIPUNCTURE: CPT

## 2025-01-21 PROCEDURE — 86706 HEP B SURFACE ANTIBODY: CPT

## 2025-01-21 PROCEDURE — 96375 TX/PRO/DX INJ NEW DRUG ADDON: CPT

## 2025-01-21 PROCEDURE — 80053 COMPREHEN METABOLIC PANEL: CPT

## 2025-01-21 PROCEDURE — 85025 COMPLETE CBC W/AUTO DIFF WBC: CPT

## 2025-01-21 PROCEDURE — 2580000003 HC RX 258: Performed by: INTERNAL MEDICINE

## 2025-01-21 PROCEDURE — 86704 HEP B CORE ANTIBODY TOTAL: CPT

## 2025-01-21 PROCEDURE — 6360000002 HC RX W HCPCS: Performed by: INTERNAL MEDICINE

## 2025-01-21 PROCEDURE — 2500000003 HC RX 250 WO HCPCS: Performed by: INTERNAL MEDICINE

## 2025-01-21 RX ORDER — DEXAMETHASONE SODIUM PHOSPHATE 10 MG/ML
10 INJECTION, SOLUTION INTRAMUSCULAR; INTRAVENOUS ONCE
Status: COMPLETED | OUTPATIENT
Start: 2025-01-21 | End: 2025-01-21

## 2025-01-21 RX ORDER — SODIUM CHLORIDE 9 MG/ML
INJECTION, SOLUTION INTRAVENOUS CONTINUOUS
Status: DISCONTINUED | OUTPATIENT
Start: 2025-01-21 | End: 2025-01-22 | Stop reason: HOSPADM

## 2025-01-21 RX ORDER — MEPERIDINE HYDROCHLORIDE 25 MG/ML
12.5 INJECTION INTRAMUSCULAR; INTRAVENOUS; SUBCUTANEOUS PRN
Status: DISCONTINUED | OUTPATIENT
Start: 2025-01-21 | End: 2025-01-22 | Stop reason: HOSPADM

## 2025-01-21 RX ORDER — ALBUTEROL SULFATE 90 UG/1
4 INHALANT RESPIRATORY (INHALATION) PRN
Status: DISCONTINUED | OUTPATIENT
Start: 2025-01-21 | End: 2025-01-22 | Stop reason: HOSPADM

## 2025-01-21 RX ORDER — ONDANSETRON 2 MG/ML
8 INJECTION INTRAMUSCULAR; INTRAVENOUS
Status: DISCONTINUED | OUTPATIENT
Start: 2025-01-21 | End: 2025-01-22 | Stop reason: HOSPADM

## 2025-01-21 RX ORDER — PALONOSETRON 0.05 MG/ML
0.25 INJECTION, SOLUTION INTRAVENOUS ONCE
Status: COMPLETED | OUTPATIENT
Start: 2025-01-21 | End: 2025-01-21

## 2025-01-21 RX ORDER — DIPHENHYDRAMINE HYDROCHLORIDE 50 MG/ML
50 INJECTION INTRAMUSCULAR; INTRAVENOUS
Status: DISCONTINUED | OUTPATIENT
Start: 2025-01-21 | End: 2025-01-22 | Stop reason: HOSPADM

## 2025-01-21 RX ORDER — DEXTROSE MONOHYDRATE 50 MG/ML
5-250 INJECTION, SOLUTION INTRAVENOUS PRN
Status: DISCONTINUED | OUTPATIENT
Start: 2025-01-21 | End: 2025-01-22 | Stop reason: HOSPADM

## 2025-01-21 RX ORDER — HYDROCORTISONE SODIUM SUCCINATE 100 MG/2ML
100 INJECTION INTRAMUSCULAR; INTRAVENOUS
Status: DISCONTINUED | OUTPATIENT
Start: 2025-01-21 | End: 2025-01-22 | Stop reason: HOSPADM

## 2025-01-21 RX ORDER — ACETAMINOPHEN 325 MG/1
650 TABLET ORAL
Status: DISCONTINUED | OUTPATIENT
Start: 2025-01-21 | End: 2025-01-22 | Stop reason: HOSPADM

## 2025-01-21 RX ORDER — EPINEPHRINE 1 MG/ML
0.3 INJECTION, SOLUTION INTRAMUSCULAR; SUBCUTANEOUS PRN
Status: DISCONTINUED | OUTPATIENT
Start: 2025-01-21 | End: 2025-01-22 | Stop reason: HOSPADM

## 2025-01-21 RX ORDER — SODIUM CHLORIDE 0.9 % (FLUSH) 0.9 %
5-40 SYRINGE (ML) INJECTION PRN
Status: DISCONTINUED | OUTPATIENT
Start: 2025-01-21 | End: 2025-01-22 | Stop reason: HOSPADM

## 2025-01-21 RX ADMIN — DEXAMETHASONE SODIUM PHOSPHATE 10 MG: 10 INJECTION INTRAMUSCULAR; INTRAVENOUS at 09:26

## 2025-01-21 RX ADMIN — OXALIPLATIN 320 MG: 5 INJECTION, SOLUTION INTRAVENOUS at 10:21

## 2025-01-21 RX ADMIN — DEXTROSE MONOHYDRATE 25 ML/HR: 50 INJECTION, SOLUTION INTRAVENOUS at 09:25

## 2025-01-21 RX ADMIN — PALONOSETRON HYDROCHLORIDE 0.25 MG: 0.25 INJECTION INTRAVENOUS at 09:29

## 2025-01-21 RX ADMIN — SODIUM CHLORIDE, PRESERVATIVE FREE 10 ML: 5 INJECTION INTRAVENOUS at 09:29

## 2025-01-21 NOTE — PROGRESS NOTES
Name: Felipe Nix    MRN: [id number]         : 1965    Mr. Nix Arrived ambulatory and in no distress for C1 D1 of Capecitabine/Oxaliplatin Regimen.  Assessment was completed, elevated BP upon arrival claims he has taken his BP med this AM, denies any symptoms. Right chest wall port accessed without difficulty, labs drawn & sent for processing.    Mr. Nix's vitals were reviewed.  Patient Vitals for the past 24 hrs:   BP Temp Temp src Pulse Resp SpO2 Height Weight   25 1227 (!) 170/87 -- -- 87 -- 94 % -- --   25 1004 (!) 164/80 -- -- -- -- -- -- --   25 0806 (!) 175/96 98 °F (36.7 °C) Temporal 86 16 95 % 1.829 m (6' 0.01\") 118.9 kg (262 lb 3.2 oz)     Lab results were obtained and reviewed.  Recent Results (from the past 12 hour(s))   Hepatitis B surface antibody    Collection Time: 25  8:17 AM   Result Value Ref Range    Hep B S Ab <3.10 mIU/mL    Hep B S Ab Interp NONREACTIVE NR     HEPATITIS B SURFACE ANTIGEN    Collection Time: 25  8:17 AM   Result Value Ref Range    Hepatitis B Surface Ag <0.10 Index    Hep B S Ag Interp Negative NEG     Comprehensive metabolic panel    Collection Time: 25  8:17 AM   Result Value Ref Range    Sodium 141 136 - 145 mmol/L    Potassium 3.6 3.5 - 5.1 mmol/L    Chloride 108 97 - 108 mmol/L    CO2 27 21 - 32 mmol/L    Anion Gap 6 2 - 12 mmol/L    Glucose 192 (H) 65 - 100 mg/dL    BUN 13 6 - 20 MG/DL    Creatinine 0.95 0.70 - 1.30 MG/DL    BUN/Creatinine Ratio 14 12 - 20      Est, Glom Filt Rate >90 >60 ml/min/1.73m2    Calcium 8.5 8.5 - 10.1 MG/DL    Total Bilirubin 0.4 0.2 - 1.0 MG/DL    ALT 47 12 - 78 U/L    AST 25 15 - 37 U/L    Alk Phosphatase 107 45 - 117 U/L    Total Protein 7.5 6.4 - 8.2 g/dL    Albumin 3.1 (L) 3.5 - 5.0 g/dL    Globulin 4.4 (H) 2.0 - 4.0 g/dL    Albumin/Globulin Ratio 0.7 (L) 1.1 - 2.2     CBC With Auto Differential    Collection Time: 25  8:17 AM   Result Value Ref Range    WBC 4.7 4.1 - 11.1 K/uL

## 2025-01-22 LAB — HBV CORE AB SERPL QL IA: NEGATIVE

## 2025-02-11 ENCOUNTER — HOSPITAL ENCOUNTER (OUTPATIENT)
Facility: HOSPITAL | Age: 60
Setting detail: INFUSION SERIES
End: 2025-02-11

## 2025-02-13 ENCOUNTER — HOSPITAL ENCOUNTER (EMERGENCY)
Facility: HOSPITAL | Age: 60
Discharge: HOME | End: 2025-02-14
Payer: COMMERCIAL

## 2025-02-13 ENCOUNTER — APPOINTMENT (OUTPATIENT)
Facility: HOSPITAL | Age: 60
End: 2025-02-13
Payer: COMMERCIAL

## 2025-02-13 DIAGNOSIS — R51.9 ACUTE NONINTRACTABLE HEADACHE, UNSPECIFIED HEADACHE TYPE: Primary | ICD-10-CM

## 2025-02-13 LAB
BASOPHILS # BLD: 0.05 K/UL (ref 0–0.1)
BASOPHILS NFR BLD: 0.7 % (ref 0–1)
COMMENT:: NORMAL
DIFFERENTIAL METHOD BLD: ABNORMAL
EOSINOPHIL # BLD: 0.01 K/UL (ref 0–0.4)
EOSINOPHIL NFR BLD: 0.1 % (ref 0–7)
ERYTHROCYTE [DISTWIDTH] IN BLOOD BY AUTOMATED COUNT: 23.9 % (ref 11.5–14.5)
HCT VFR BLD AUTO: 43.5 % (ref 36.6–50.3)
HGB BLD-MCNC: 13.5 G/DL (ref 12.1–17)
IMM GRANULOCYTES # BLD AUTO: 0.03 K/UL (ref 0–0.04)
IMM GRANULOCYTES NFR BLD AUTO: 0.4 % (ref 0–0.5)
LYMPHOCYTES # BLD: 1.87 K/UL (ref 0.8–3.5)
LYMPHOCYTES NFR BLD: 27.9 % (ref 12–49)
MCH RBC QN AUTO: 24.6 PG (ref 26–34)
MCHC RBC AUTO-ENTMCNC: 31 G/DL (ref 30–36.5)
MCV RBC AUTO: 79.4 FL (ref 80–99)
MONOCYTES # BLD: 0.71 K/UL (ref 0–1)
MONOCYTES NFR BLD: 10.6 % (ref 5–13)
NEUTS SEG # BLD: 4.04 K/UL (ref 1.8–8)
NEUTS SEG NFR BLD: 60.3 % (ref 32–75)
NRBC # BLD: 0 K/UL (ref 0–0.01)
NRBC BLD-RTO: 0 PER 100 WBC
PLATELET # BLD AUTO: 263 K/UL (ref 150–400)
RBC # BLD AUTO: 5.48 M/UL (ref 4.1–5.7)
RBC MORPH BLD: ABNORMAL
SPECIMEN HOLD: NORMAL
WBC # BLD AUTO: 6.7 K/UL (ref 4.1–11.1)

## 2025-02-13 PROCEDURE — 96375 TX/PRO/DX INJ NEW DRUG ADDON: CPT

## 2025-02-13 PROCEDURE — 96374 THER/PROPH/DIAG INJ IV PUSH: CPT

## 2025-02-13 PROCEDURE — 70450 CT HEAD/BRAIN W/O DYE: CPT

## 2025-02-13 PROCEDURE — 6360000002 HC RX W HCPCS

## 2025-02-13 PROCEDURE — 96361 HYDRATE IV INFUSION ADD-ON: CPT

## 2025-02-13 PROCEDURE — 80053 COMPREHEN METABOLIC PANEL: CPT

## 2025-02-13 PROCEDURE — 99284 EMERGENCY DEPT VISIT MOD MDM: CPT

## 2025-02-13 PROCEDURE — 36415 COLL VENOUS BLD VENIPUNCTURE: CPT

## 2025-02-13 PROCEDURE — 2580000003 HC RX 258

## 2025-02-13 PROCEDURE — 85025 COMPLETE CBC W/AUTO DIFF WBC: CPT

## 2025-02-13 PROCEDURE — 6370000000 HC RX 637 (ALT 250 FOR IP)

## 2025-02-13 RX ORDER — KETOROLAC TROMETHAMINE 30 MG/ML
30 INJECTION, SOLUTION INTRAMUSCULAR; INTRAVENOUS
Status: COMPLETED | OUTPATIENT
Start: 2025-02-13 | End: 2025-02-13

## 2025-02-13 RX ORDER — PROCHLORPERAZINE EDISYLATE 5 MG/ML
10 INJECTION INTRAMUSCULAR; INTRAVENOUS ONCE
Status: COMPLETED | OUTPATIENT
Start: 2025-02-13 | End: 2025-02-13

## 2025-02-13 RX ORDER — ACETAMINOPHEN 500 MG
1000 TABLET ORAL
Status: COMPLETED | OUTPATIENT
Start: 2025-02-13 | End: 2025-02-13

## 2025-02-13 RX ORDER — DIPHENHYDRAMINE HYDROCHLORIDE 50 MG/ML
25 INJECTION INTRAMUSCULAR; INTRAVENOUS
Status: COMPLETED | OUTPATIENT
Start: 2025-02-13 | End: 2025-02-13

## 2025-02-13 RX ORDER — BUTALBITAL, ACETAMINOPHEN AND CAFFEINE 50; 325; 40 MG/1; MG/1; MG/1
1 TABLET ORAL EVERY 4 HOURS PRN
Qty: 10 TABLET | Refills: 0 | Status: SHIPPED | OUTPATIENT
Start: 2025-02-13

## 2025-02-13 RX ORDER — 0.9 % SODIUM CHLORIDE 0.9 %
1000 INTRAVENOUS SOLUTION INTRAVENOUS ONCE
Status: COMPLETED | OUTPATIENT
Start: 2025-02-13 | End: 2025-02-14

## 2025-02-13 RX ADMIN — KETOROLAC TROMETHAMINE 30 MG: 30 INJECTION, SOLUTION INTRAMUSCULAR at 22:48

## 2025-02-13 RX ADMIN — DIPHENHYDRAMINE HYDROCHLORIDE 25 MG: 50 INJECTION INTRAMUSCULAR; INTRAVENOUS at 22:50

## 2025-02-13 RX ADMIN — SODIUM CHLORIDE 1000 ML: 0.9 INJECTION, SOLUTION INTRAVENOUS at 22:41

## 2025-02-13 RX ADMIN — ACETAMINOPHEN 1000 MG: 500 TABLET, FILM COATED ORAL at 22:51

## 2025-02-13 RX ADMIN — PROCHLORPERAZINE EDISYLATE 10 MG: 5 INJECTION INTRAMUSCULAR; INTRAVENOUS at 22:46

## 2025-02-13 ASSESSMENT — PAIN DESCRIPTION - PAIN TYPE
TYPE: ACUTE PAIN
TYPE: ACUTE PAIN

## 2025-02-13 ASSESSMENT — PAIN - FUNCTIONAL ASSESSMENT
PAIN_FUNCTIONAL_ASSESSMENT: ACTIVITIES ARE NOT PREVENTED
PAIN_FUNCTIONAL_ASSESSMENT: ACTIVITIES ARE NOT PREVENTED
PAIN_FUNCTIONAL_ASSESSMENT: 0-10

## 2025-02-13 ASSESSMENT — LIFESTYLE VARIABLES
HOW MANY STANDARD DRINKS CONTAINING ALCOHOL DO YOU HAVE ON A TYPICAL DAY: PATIENT DOES NOT DRINK
HOW OFTEN DO YOU HAVE A DRINK CONTAINING ALCOHOL: NEVER

## 2025-02-13 ASSESSMENT — PAIN DESCRIPTION - LOCATION
LOCATION: HEAD
LOCATION: HEAD

## 2025-02-13 ASSESSMENT — PAIN DESCRIPTION - FREQUENCY: FREQUENCY: CONTINUOUS

## 2025-02-13 ASSESSMENT — PAIN SCALES - GENERAL
PAINLEVEL_OUTOF10: 10
PAINLEVEL_OUTOF10: 4
PAINLEVEL_OUTOF10: 4

## 2025-02-13 ASSESSMENT — PAIN DESCRIPTION - DESCRIPTORS
DESCRIPTORS: THROBBING
DESCRIPTORS: ACHING

## 2025-02-13 ASSESSMENT — PAIN DESCRIPTION - ORIENTATION
ORIENTATION: RIGHT;LEFT
ORIENTATION: RIGHT;LEFT

## 2025-02-13 ASSESSMENT — PAIN DESCRIPTION - ONSET: ONSET: ON-GOING

## 2025-02-14 VITALS
DIASTOLIC BLOOD PRESSURE: 89 MMHG | BODY MASS INDEX: 35.53 KG/M2 | SYSTOLIC BLOOD PRESSURE: 158 MMHG | TEMPERATURE: 98.2 F | OXYGEN SATURATION: 93 % | WEIGHT: 262 LBS | RESPIRATION RATE: 18 BRPM | HEART RATE: 73 BPM

## 2025-02-14 LAB
ALBUMIN SERPL-MCNC: 2.9 G/DL (ref 3.5–5)
ALBUMIN/GLOB SERPL: 0.6 (ref 1.1–2.2)
ALP SERPL-CCNC: 112 U/L (ref 45–117)
ALT SERPL-CCNC: 42 U/L (ref 12–78)
ANION GAP SERPL CALC-SCNC: 6 MMOL/L (ref 2–12)
AST SERPL-CCNC: ABNORMAL U/L (ref 15–37)
BILIRUB SERPL-MCNC: 0.4 MG/DL (ref 0.2–1)
BUN SERPL-MCNC: 16 MG/DL (ref 6–20)
BUN/CREAT SERPL: 18 (ref 12–20)
CALCIUM SERPL-MCNC: 8.4 MG/DL (ref 8.5–10.1)
CHLORIDE SERPL-SCNC: 110 MMOL/L (ref 97–108)
CO2 SERPL-SCNC: 23 MMOL/L (ref 21–32)
CREAT SERPL-MCNC: 0.89 MG/DL (ref 0.7–1.3)
GLOBULIN SER CALC-MCNC: 4.9 G/DL (ref 2–4)
GLUCOSE SERPL-MCNC: 128 MG/DL (ref 65–100)
POTASSIUM SERPL-SCNC: ABNORMAL MMOL/L (ref 3.5–5.1)
PROT SERPL-MCNC: 7.8 G/DL (ref 6.4–8.2)
SODIUM SERPL-SCNC: 139 MMOL/L (ref 136–145)

## 2025-02-14 NOTE — ED NOTES
Bedside shift change report given to WAYNE Gibbs (oncoming nurse) by WAYNE Lu (offgoing nurse). Report included the following information Nurse Handoff Report, Index, ED Encounter Summary, ED SBAR, Adult Overview, Surgery Report, Intake/Output, MAR, Recent Results, and Med Rec Status.

## 2025-02-14 NOTE — ED PROVIDER NOTES
[facility] EMERGENCY DEPARTMENT  EMERGENCY DEPARTMENT ENCOUNTER       Pt Name: Felipe Nix  MRN: [id number]  Birthdate 1965  Date of Evaluation: 2/13/2025  Provider: HERLINDA Cano - NP   PCP: Liana Cruz MD  Note Started: 12:30 AM 2/14/25     CHIEF COMPLAINT       Chief Complaint   Patient presents with    Headache     Patient ambulatory to triage with c/o headache that started today.No other complaints        HISTORY OF PRESENT ILLNESS: 1 or more elements      History From: Patient and Patient's Wife  HPI Limitations None     Felipe Nix is a 59 y.o. male with PMHx of hypertension and current stage III colon cancer on chemo who presents to the ED today for concerns regarding headache since yesterday.  Patient also endorses nausea.  Headache is 8/10.  Patient has not taken any medicines.  Patient had a chemo treatment on Tuesday.  Denies fevers, sore throat, runny nose, cough, chest pain, abdominal pain.  No urinary symptoms or diarrhea.     See MDM Documentation for further HPI and PMHx     Nursing Notes were all reviewed and agreed with or any disagreements were addressed in the HPI.     REVIEW OF SYSTEMS      Review of Systems     Positives and Pertinent negatives as per HPI.    PAST HISTORY     Past Medical History:  Past Medical History:   Diagnosis Date    At risk for sleep apnea 11/04/2024    PRECIOUS 5    colon cancer 10/2024    current chemo       Past Surgical History:  Past Surgical History:   Procedure Laterality Date    COLONOSCOPY N/A 10/25/2024    COLONOSCOPY DIAGNOSTIC performed by Gerard Varner MD at [facility] ENDOSCOPY    HERNIA REPAIR      IR PORT PLACEMENT > 5 YEARS  12/11/2024    IR PORT PLACEMENT EQUAL OR GREATER THAN 5 YEARS 12/11/2024 Paige Rebolledo MD [facility] RAD ANGIO IR    SMALL INTESTINE SURGERY Right 11/5/2024    ROBOTIC RIGHT COLECTOMY (E R A S) performed by Torrey Henderson II, MD at [facility] MAIN OR       Family History:  Family History   Problem Relation Age of Onset

## 2025-02-14 NOTE — ED NOTES
Patient discharged from the ED by Angel MCKEON. Diagnosis, medications, precautions and follow-ups were reviewed with the patient/family. Questions were asked and answered prior to departure.

## 2025-07-16 ENCOUNTER — HOSPITAL ENCOUNTER (EMERGENCY)
Facility: HOSPITAL | Age: 60
Discharge: HOME | End: 2025-07-16
Payer: COMMERCIAL

## 2025-07-16 ENCOUNTER — APPOINTMENT (OUTPATIENT)
Facility: HOSPITAL | Age: 60
End: 2025-07-16
Payer: COMMERCIAL

## 2025-07-16 VITALS
RESPIRATION RATE: 14 BRPM | SYSTOLIC BLOOD PRESSURE: 184 MMHG | HEART RATE: 76 BPM | OXYGEN SATURATION: 96 % | WEIGHT: 262 LBS | BODY MASS INDEX: 35.53 KG/M2 | DIASTOLIC BLOOD PRESSURE: 98 MMHG | TEMPERATURE: 98.1 F

## 2025-07-16 DIAGNOSIS — M54.42 ACUTE LEFT-SIDED LOW BACK PAIN WITH LEFT-SIDED SCIATICA: Primary | ICD-10-CM

## 2025-07-16 DIAGNOSIS — I10 ESSENTIAL HYPERTENSION: ICD-10-CM

## 2025-07-16 DIAGNOSIS — M51.362 DEGENERATION OF INTERVERTEBRAL DISC OF LUMBAR REGION WITH DISCOGENIC BACK PAIN AND LOWER EXTREMITY PAIN: ICD-10-CM

## 2025-07-16 PROCEDURE — 72100 X-RAY EXAM L-S SPINE 2/3 VWS: CPT

## 2025-07-16 PROCEDURE — 99283 EMERGENCY DEPT VISIT LOW MDM: CPT

## 2025-07-16 PROCEDURE — 6370000000 HC RX 637 (ALT 250 FOR IP): Performed by: PHYSICIAN ASSISTANT

## 2025-07-16 RX ORDER — LIDOCAINE 50 MG/G
1 PATCH TOPICAL DAILY
Qty: 10 PATCH | Refills: 0 | Status: SHIPPED | OUTPATIENT
Start: 2025-07-16 | End: 2025-07-26

## 2025-07-16 RX ORDER — OXYCODONE HYDROCHLORIDE 5 MG/1
5 TABLET ORAL
Refills: 0 | Status: COMPLETED | OUTPATIENT
Start: 2025-07-16 | End: 2025-07-16

## 2025-07-16 RX ORDER — PREDNISONE 10 MG/1
TABLET ORAL
Qty: 1 EACH | Refills: 0 | Status: SHIPPED | OUTPATIENT
Start: 2025-07-16

## 2025-07-16 RX ORDER — OXYCODONE HYDROCHLORIDE 5 MG/1
5 TABLET ORAL EVERY 6 HOURS PRN
Qty: 12 TABLET | Refills: 0 | Status: SHIPPED | OUTPATIENT
Start: 2025-07-16 | End: 2025-07-19

## 2025-07-16 RX ADMIN — OXYCODONE 5 MG: 5 TABLET ORAL at 09:59

## 2025-07-16 ASSESSMENT — PAIN SCALES - GENERAL
PAINLEVEL_OUTOF10: 9
PAINLEVEL_OUTOF10: 10
PAINLEVEL_OUTOF10: 10

## 2025-07-16 ASSESSMENT — PAIN DESCRIPTION - LOCATION
LOCATION: BACK
LOCATION: BACK

## 2025-07-16 ASSESSMENT — PAIN - FUNCTIONAL ASSESSMENT: PAIN_FUNCTIONAL_ASSESSMENT: 0-10

## 2025-07-16 ASSESSMENT — PAIN DESCRIPTION - ORIENTATION
ORIENTATION: LOWER;LEFT
ORIENTATION: LOWER

## 2025-07-16 ASSESSMENT — PAIN DESCRIPTION - DESCRIPTORS: DESCRIPTORS: ACHING

## 2025-07-16 NOTE — ED PROVIDER NOTES
[facility] EMERGENCY DEPARTMENT  EMERGENCY DEPARTMENT ENCOUNTER       Pt Name: Felipe Nix  MRN: [id number]  Birthdate 1965  Date of evaluation: 7/16/2025  Provider: STANISLAW Carlos   PCP: Liana Cruz MD  Note Started: 9:47 AM EDT 7/16/25     CHIEF COMPLAINT       Chief Complaint   Patient presents with    Back Pain     Patient ambulatory to triage c/o L-sided lower back pain that radiates down the L leg x2-3 weeks. Patient denies history of back problems.         HISTORY OF PRESENT ILLNESS: 1 or more elements      History From: Patient  HPI Limitations: None     Felipe Nix is a 60 y.o. male who presents ambulatory with 2 to 3 weeks of intermittent left-sided lower back pain.  Symptoms radiate down the left leg occasionally.  He describes a remote injury years ago when he fell.  Denies any history of back surgery.  He convincingly denies any history of IV drug use.  He does have a history of colon cancer for which he receives monthly chemotherapy.  He denies any bowel or bladder dysfunction and specifically denies any acute urinary retention or anal leakage.     Nursing Notes were all reviewed and agreed with or any disagreements were addressed in the HPI.     REVIEW OF SYSTEMS      Review of Systems     Positives and Pertinent negatives as per HPI.    PAST HISTORY     Past Medical History:  Past Medical History:   Diagnosis Date    At risk for sleep apnea 11/04/2024    PRECIOUS 5    colon cancer 10/2024    current chemo    Hypertension        Past Surgical History:  Past Surgical History:   Procedure Laterality Date    COLONOSCOPY N/A 10/25/2024    COLONOSCOPY DIAGNOSTIC performed by Gerard Varner MD at [facility] ENDOSCOPY    HERNIA REPAIR      IR PORT PLACEMENT > 5 YEARS  12/11/2024    IR PORT PLACEMENT EQUAL OR GREATER THAN 5 YEARS 12/11/2024 Paige Rebolledo MD [facility] RAD ANGIO IR    SMALL INTESTINE SURGERY Right 11/5/2024    ROBOTIC RIGHT COLECTOMY (E R A S) performed by Torrey Henderson II, MD at [facility]

## 2025-07-16 NOTE — DISCHARGE INSTRUCTIONS
Thank You!    It was a pleasure taking care of you in our Emergency Department today. We know that when you come to our Emergency Department, you are entrusting us with your health, comfort, and safety. Our physicians and nurses honor that trust, and truly appreciate the opportunity to care for you and your loved ones.      We also value your feedback. If you receive a survey about your Emergency Department experience today, please fill it out.  We care about our patients' feedback, and we listen to what you have to say.  Thank you.    Cole Suero PA-C      ________________________________________________________________________  I have included a copy of your lab results and/or radiologic studies from today's visit so you can have them easily available at your follow-up visit. We hope you feel better and please do not hesitate to contact the ED if you have any questions at all!    No results found for this or any previous visit (from the past 12 hours).    XR LUMBAR SPINE (2-3 VIEWS)   Final Result      1. No fracture.   2. Degenerative disc disease and facet arthrosis.                  Electronically signed by Wicho Knight        [unfilled]  The exam and treatment you received in the Emergency Department were for an urgent problem and are not intended as complete care. It is important that you follow up with a doctor, nurse practitioner, or physician assistant for ongoing care. If your symptoms become worse or you do not improve as expected and you are unable to reach your usual health care provider, you should return to the Emergency Department. We are available 24 hours a day.    Please take your discharge instructions with you when you go to your follow-up appointment.     If a prescription has been provided, please have it filled as soon as possible to prevent a delay in treatment. Read the entire medication instruction sheet provided to you by the pharmacy. If you have any questions or reservations about

## 2025-08-04 ENCOUNTER — OFFICE VISIT (OUTPATIENT)
Facility: CLINIC | Age: 60
End: 2025-08-04
Payer: COMMERCIAL

## 2025-08-04 VITALS
BODY MASS INDEX: 36.16 KG/M2 | TEMPERATURE: 98 F | HEART RATE: 71 BPM | SYSTOLIC BLOOD PRESSURE: 171 MMHG | OXYGEN SATURATION: 98 % | RESPIRATION RATE: 16 BRPM | HEIGHT: 72 IN | DIASTOLIC BLOOD PRESSURE: 97 MMHG | WEIGHT: 267 LBS

## 2025-08-04 DIAGNOSIS — I10 PRIMARY HYPERTENSION: Primary | ICD-10-CM

## 2025-08-04 DIAGNOSIS — M79.18 GLUTEAL PAIN: ICD-10-CM

## 2025-08-04 PROCEDURE — 3079F DIAST BP 80-89 MM HG: CPT | Performed by: STUDENT IN AN ORGANIZED HEALTH CARE EDUCATION/TRAINING PROGRAM

## 2025-08-04 PROCEDURE — 99214 OFFICE O/P EST MOD 30 MIN: CPT | Performed by: STUDENT IN AN ORGANIZED HEALTH CARE EDUCATION/TRAINING PROGRAM

## 2025-08-04 PROCEDURE — 3077F SYST BP >= 140 MM HG: CPT | Performed by: STUDENT IN AN ORGANIZED HEALTH CARE EDUCATION/TRAINING PROGRAM

## 2025-08-04 RX ORDER — BACLOFEN 10 MG/1
10 TABLET ORAL 3 TIMES DAILY
Qty: 30 TABLET | Refills: 0 | Status: SHIPPED | OUTPATIENT
Start: 2025-08-04

## 2025-08-04 RX ORDER — VALSARTAN AND HYDROCHLOROTHIAZIDE 80; 12.5 MG/1; MG/1
1 TABLET, FILM COATED ORAL DAILY
Qty: 90 TABLET | Refills: 1 | Status: SHIPPED | OUTPATIENT
Start: 2025-08-04

## 2025-08-04 ASSESSMENT — PATIENT HEALTH QUESTIONNAIRE - PHQ9
SUM OF ALL RESPONSES TO PHQ QUESTIONS 1-9: 0
SUM OF ALL RESPONSES TO PHQ QUESTIONS 1-9: 0
1. LITTLE INTEREST OR PLEASURE IN DOING THINGS: NOT AT ALL
2. FEELING DOWN, DEPRESSED OR HOPELESS: NOT AT ALL
SUM OF ALL RESPONSES TO PHQ QUESTIONS 1-9: 0
SUM OF ALL RESPONSES TO PHQ QUESTIONS 1-9: 0

## 2025-08-13 ENCOUNTER — HOSPITAL ENCOUNTER (OUTPATIENT)
Facility: HOSPITAL | Age: 60
Setting detail: RECURRING SERIES
Discharge: HOME | End: 2025-08-16
Attending: STUDENT IN AN ORGANIZED HEALTH CARE EDUCATION/TRAINING PROGRAM
Payer: COMMERCIAL

## 2025-08-13 PROCEDURE — 97162 PT EVAL MOD COMPLEX 30 MIN: CPT

## 2025-08-20 ENCOUNTER — OFFICE VISIT (OUTPATIENT)
Facility: CLINIC | Age: 60
End: 2025-08-20
Payer: COMMERCIAL

## 2025-08-20 VITALS
OXYGEN SATURATION: 98 % | RESPIRATION RATE: 16 BRPM | WEIGHT: 265.6 LBS | BODY MASS INDEX: 35.97 KG/M2 | HEART RATE: 72 BPM | DIASTOLIC BLOOD PRESSURE: 93 MMHG | HEIGHT: 72 IN | TEMPERATURE: 97.9 F | SYSTOLIC BLOOD PRESSURE: 169 MMHG

## 2025-08-20 DIAGNOSIS — I10 PRIMARY HYPERTENSION: Primary | ICD-10-CM

## 2025-08-20 PROCEDURE — 3080F DIAST BP >= 90 MM HG: CPT | Performed by: STUDENT IN AN ORGANIZED HEALTH CARE EDUCATION/TRAINING PROGRAM

## 2025-08-20 PROCEDURE — 3077F SYST BP >= 140 MM HG: CPT | Performed by: STUDENT IN AN ORGANIZED HEALTH CARE EDUCATION/TRAINING PROGRAM

## 2025-08-20 PROCEDURE — 99214 OFFICE O/P EST MOD 30 MIN: CPT | Performed by: STUDENT IN AN ORGANIZED HEALTH CARE EDUCATION/TRAINING PROGRAM

## 2025-08-20 RX ORDER — VALSARTAN AND HYDROCHLOROTHIAZIDE 160; 25 MG/1; MG/1
1 TABLET ORAL DAILY
Qty: 90 TABLET | Refills: 1 | Status: SHIPPED | OUTPATIENT
Start: 2025-08-20

## 2025-08-21 LAB
ALBUMIN SERPL-MCNC: 3.3 G/DL (ref 3.5–5.2)
ALBUMIN/GLOB SERPL: 0.9 (ref 1.1–2.2)
ALP SERPL-CCNC: 99 U/L (ref 40–129)
ALT SERPL-CCNC: 29 U/L (ref 10–50)
ANION GAP SERPL CALC-SCNC: 10 MMOL/L (ref 2–14)
AST SERPL-CCNC: 29 U/L (ref 10–50)
BILIRUB SERPL-MCNC: 0.2 MG/DL (ref 0–1.2)
BUN SERPL-MCNC: 11 MG/DL (ref 6–20)
BUN/CREAT SERPL: 14 (ref 12–20)
CALCIUM SERPL-MCNC: 9 MG/DL (ref 8.6–10)
CHLORIDE SERPL-SCNC: 106 MMOL/L (ref 98–107)
CO2 SERPL-SCNC: 25 MMOL/L (ref 20–29)
CREAT SERPL-MCNC: 0.8 MG/DL (ref 0.7–1.2)
GLOBULIN SER CALC-MCNC: 3.6 G/DL (ref 2–4)
GLUCOSE SERPL-MCNC: 117 MG/DL (ref 65–100)
POTASSIUM SERPL-SCNC: 4 MMOL/L (ref 3.5–5.1)
PROT SERPL-MCNC: 6.9 G/DL (ref 6.4–8.3)
SODIUM SERPL-SCNC: 142 MMOL/L (ref 136–145)

## (undated) DEVICE — SYRINGE MED 10ML LUERLOCK TIP W/O SFTY DISP

## (undated) DEVICE — SUTURE V-LOC 90 3-0 L9IN ABSRB VLT L26MM V-20 1/2 CIR TAPR VLOCM0644

## (undated) DEVICE — CUFF BLD PRSS AD CLTH SGL TB W/ BAYNT CONN ROUNDED CORNER

## (undated) DEVICE — GENERAL LAPAROSCOPY-MRMC: Brand: MEDLINE INDUSTRIES, INC.

## (undated) DEVICE — TOWEL,OR,DSP,ST,BLUE,STD,4/PK,20PK/CS: Brand: MEDLINE

## (undated) DEVICE — SPONGE LAP W18XL18IN WHT COT 4 PLY FLD STRUNG RADPQ DISP ST 2 PER PACK

## (undated) DEVICE — HYPODERMIC SAFETY NEEDLE: Brand: MAGELLAN

## (undated) DEVICE — NEEDLE SCLERO 25GA L240CM OD0.51MM ID0.24MM EXTN L4MM SHTH

## (undated) DEVICE — VESSEL SEALER EXTEND: Brand: ENDOWRIST

## (undated) DEVICE — SET GRAV CK VLV NEEDLESS ST 3 GANGED 4WAY STPCOCK HI FLO 10

## (undated) DEVICE — SUTURE VICRYL SZ 2-0 L27IN ABSRB UD L26MM SH 1/2 CIR J417H

## (undated) DEVICE — WOUND RETRACTOR AND PROTECTOR: Brand: ALEXIS WOUND PROTECTOR-RETRACTOR

## (undated) DEVICE — TIP SUCT TRNSPAR RIB SURF STD BLB RIG NVENT W/ 5IN1 CONN DYND50138] MEDLINE INDUSTRIES INC]

## (undated) DEVICE — IV START KIT: Brand: MEDLINE

## (undated) DEVICE — PAD PT POS 36 IN SURGYPAD DISP

## (undated) DEVICE — BLADELESS OBTURATOR: Brand: WECK VISTA

## (undated) DEVICE — SUTURE VICRYL + SZ 3-0 L27IN ABSRB UD L26MM SH 1/2 CIR VCP416H

## (undated) DEVICE — ENDOSCOPIC KIT COMPLIANCE ENDOKIT

## (undated) DEVICE — Device

## (undated) DEVICE — NEEDLE INSUFFLATION 14 GAX120 MM SURGINEEDLE

## (undated) DEVICE — SUTURE MONOCRYL SZ 4-0 L27IN ABSRB UD L19MM PS-2 1/2 CIR PRIM Y426H

## (undated) DEVICE — LIQUIBAND RAPID ADHESIVE 36/CS 0.8ML: Brand: MEDLINE

## (undated) DEVICE — 1LYRTR 16FR10ML100%SIL UMS SNP: Brand: MEDLINE INDUSTRIES, INC.

## (undated) DEVICE — SUTURE PDS II SZ 0 L60IN ABSRB VLT L48MM CTX 1/2 CIR Z990G

## (undated) DEVICE — SOLUTION ANTIFOG VIS SYS CLEARIFY LAPSCP

## (undated) DEVICE — SEAL

## (undated) DEVICE — GLOVE SURG SZ 75 L12IN FNGR THK79MIL GRN LTX FREE

## (undated) DEVICE — 3M™ IOBAN™ 2 ANTIMICROBIAL INCISE DRAPE 6650EZ: Brand: IOBAN™ 2

## (undated) DEVICE — STAPLER 60: Brand: SUREFORM

## (undated) DEVICE — ARM DRAPE

## (undated) DEVICE — GLOVE ORANGE PI 7   MSG9070